# Patient Record
Sex: MALE | Race: BLACK OR AFRICAN AMERICAN
[De-identification: names, ages, dates, MRNs, and addresses within clinical notes are randomized per-mention and may not be internally consistent; named-entity substitution may affect disease eponyms.]

---

## 2017-02-20 ENCOUNTER — HOSPITAL ENCOUNTER (INPATIENT)
Dept: HOSPITAL 61 - ER | Age: 82
LOS: 7 days | Discharge: HOME | DRG: 166 | End: 2017-02-27
Attending: INTERNAL MEDICINE | Admitting: INTERNAL MEDICINE
Payer: COMMERCIAL

## 2017-02-20 VITALS — WEIGHT: 146.38 LBS | HEIGHT: 70 IN | BODY MASS INDEX: 20.96 KG/M2

## 2017-02-20 VITALS — DIASTOLIC BLOOD PRESSURE: 56 MMHG | SYSTOLIC BLOOD PRESSURE: 85 MMHG

## 2017-02-20 VITALS — SYSTOLIC BLOOD PRESSURE: 122 MMHG | DIASTOLIC BLOOD PRESSURE: 69 MMHG

## 2017-02-20 DIAGNOSIS — I25.10: ICD-10-CM

## 2017-02-20 DIAGNOSIS — Z80.1: ICD-10-CM

## 2017-02-20 DIAGNOSIS — N17.0: ICD-10-CM

## 2017-02-20 DIAGNOSIS — E86.0: ICD-10-CM

## 2017-02-20 DIAGNOSIS — N18.3: ICD-10-CM

## 2017-02-20 DIAGNOSIS — C34.02: Primary | ICD-10-CM

## 2017-02-20 DIAGNOSIS — E44.0: ICD-10-CM

## 2017-02-20 DIAGNOSIS — E11.00: ICD-10-CM

## 2017-02-20 DIAGNOSIS — E87.0: ICD-10-CM

## 2017-02-20 DIAGNOSIS — Z79.899: ICD-10-CM
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
## 2017-02-20 DIAGNOSIS — J95.811: ICD-10-CM

## 2017-02-20 DIAGNOSIS — Z87.891: ICD-10-CM

## 2017-02-20 DIAGNOSIS — E11.22: ICD-10-CM

## 2017-02-20 DIAGNOSIS — K76.89: ICD-10-CM

## 2017-02-20 DIAGNOSIS — I12.9: ICD-10-CM

## 2017-02-20 DIAGNOSIS — N28.1: ICD-10-CM

## 2017-02-20 DIAGNOSIS — N40.0: ICD-10-CM

## 2017-02-20 DIAGNOSIS — E11.65: ICD-10-CM

## 2017-02-20 DIAGNOSIS — R13.10: ICD-10-CM

## 2017-02-20 LAB
ANION GAP SERPL CALC-SCNC: 14 MMOL/L (ref 6–14)
ANION GAP SERPL CALC-SCNC: 15 MMOL/L (ref 6–14)
BASOPHILS # BLD AUTO: 0 X10^3/UL (ref 0–0.2)
BASOPHILS NFR BLD: 1 % (ref 0–3)
BUN SERPL-MCNC: 62 MG/DL (ref 8–26)
BUN SERPL-MCNC: 63 MG/DL (ref 8–26)
CALCIUM SERPL-MCNC: 10.2 MG/DL (ref 8.5–10.1)
CALCIUM SERPL-MCNC: 9.5 MG/DL (ref 8.5–10.1)
CHLORIDE SERPL-SCNC: 95 MMOL/L (ref 98–107)
CHLORIDE SERPL-SCNC: 98 MMOL/L (ref 98–107)
CO2 SERPL-SCNC: 20 MMOL/L (ref 21–32)
CO2 SERPL-SCNC: 24 MMOL/L (ref 21–32)
CREAT SERPL-MCNC: 2.5 MG/DL (ref 0.7–1.3)
CREAT SERPL-MCNC: 2.8 MG/DL (ref 0.7–1.3)
EOSINOPHIL NFR BLD: 2 % (ref 0–3)
ERYTHROCYTE [DISTWIDTH] IN BLOOD BY AUTOMATED COUNT: 14 % (ref 11.5–14.5)
GFR SERPLBLD BASED ON 1.73 SQ M-ARVRAT: 26.6 ML/MIN
GFR SERPLBLD BASED ON 1.73 SQ M-ARVRAT: 30.3 ML/MIN
GLUCOSE SERPL-MCNC: 628 MG/DL (ref 70–99)
GLUCOSE SERPL-MCNC: 775 MG/DL (ref 70–99)
HCT VFR BLD CALC: 44.5 % (ref 39–53)
HGB BLD-MCNC: 14.2 G/DL (ref 13–17.5)
LYMPHOCYTES # BLD: 1.3 X10^3/UL (ref 1–4.8)
LYMPHOCYTES NFR BLD AUTO: 27 % (ref 24–48)
MCH RBC QN AUTO: 27 PG (ref 25–35)
MCHC RBC AUTO-ENTMCNC: 32 G/DL (ref 31–37)
MCV RBC AUTO: 86 FL (ref 79–100)
MONOCYTES NFR BLD: 6 % (ref 0–9)
NEUTROPHILS NFR BLD AUTO: 65 % (ref 31–73)
OBC FLU: (no result)
PLATELET # BLD AUTO: 156 X10^3/UL (ref 140–400)
POTASSIUM SERPL-SCNC: 5.2 MMOL/L (ref 3.5–5.1)
POTASSIUM SERPL-SCNC: 5.3 MMOL/L (ref 3.5–5.1)
RBC # BLD AUTO: 5.17 X10^6/UL (ref 4.3–5.7)
SODIUM SERPL-SCNC: 133 MMOL/L (ref 136–145)
SODIUM SERPL-SCNC: 133 MMOL/L (ref 136–145)
WBC # BLD AUTO: 4.6 X10^3/UL (ref 4–11)

## 2017-02-20 PROCEDURE — 84100 ASSAY OF PHOSPHORUS: CPT

## 2017-02-20 PROCEDURE — 32557 INSERT CATH PLEURA W/ IMAGE: CPT

## 2017-02-20 PROCEDURE — 83036 HEMOGLOBIN GLYCOSYLATED A1C: CPT

## 2017-02-20 PROCEDURE — 81001 URINALYSIS AUTO W/SCOPE: CPT

## 2017-02-20 PROCEDURE — 87804 INFLUENZA ASSAY W/OPTIC: CPT

## 2017-02-20 PROCEDURE — 87086 URINE CULTURE/COLONY COUNT: CPT

## 2017-02-20 PROCEDURE — 71020: CPT

## 2017-02-20 PROCEDURE — 82570 ASSAY OF URINE CREATININE: CPT

## 2017-02-20 PROCEDURE — 71035: CPT

## 2017-02-20 PROCEDURE — 80053 COMPREHEN METABOLIC PANEL: CPT

## 2017-02-20 PROCEDURE — 71250 CT THORAX DX C-: CPT

## 2017-02-20 PROCEDURE — 94640 AIRWAY INHALATION TREATMENT: CPT

## 2017-02-20 PROCEDURE — 85027 COMPLETE CBC AUTOMATED: CPT

## 2017-02-20 PROCEDURE — 88305 TISSUE EXAM BY PATHOLOGIST: CPT

## 2017-02-20 PROCEDURE — 88104 CYTOPATH FL NONGYN SMEARS: CPT

## 2017-02-20 PROCEDURE — 90686 IIV4 VACC NO PRSV 0.5 ML IM: CPT

## 2017-02-20 PROCEDURE — 36415 COLL VENOUS BLD VENIPUNCTURE: CPT

## 2017-02-20 PROCEDURE — 96360 HYDRATION IV INFUSION INIT: CPT

## 2017-02-20 PROCEDURE — C1729 CATH, DRAINAGE: HCPCS

## 2017-02-20 PROCEDURE — 85018 HEMOGLOBIN: CPT

## 2017-02-20 PROCEDURE — 85610 PROTHROMBIN TIME: CPT

## 2017-02-20 PROCEDURE — 84156 ASSAY OF PROTEIN URINE: CPT

## 2017-02-20 PROCEDURE — 71010: CPT

## 2017-02-20 PROCEDURE — 77012 CT SCAN FOR NEEDLE BIOPSY: CPT

## 2017-02-20 PROCEDURE — 82310 ASSAY OF CALCIUM: CPT

## 2017-02-20 PROCEDURE — 87070 CULTURE OTHR SPECIMN AEROBIC: CPT

## 2017-02-20 PROCEDURE — C1892 INTRO/SHEATH,FIXED,PEEL-AWAY: HCPCS

## 2017-02-20 PROCEDURE — A4215 STERILE NEEDLE: HCPCS

## 2017-02-20 PROCEDURE — 84550 ASSAY OF BLOOD/URIC ACID: CPT

## 2017-02-20 PROCEDURE — 84300 ASSAY OF URINE SODIUM: CPT

## 2017-02-20 PROCEDURE — 88112 CYTOPATH CELL ENHANCE TECH: CPT

## 2017-02-20 PROCEDURE — 82550 ASSAY OF CK (CPK): CPT

## 2017-02-20 PROCEDURE — 70450 CT HEAD/BRAIN W/O DYE: CPT

## 2017-02-20 PROCEDURE — 87205 SMEAR GRAM STAIN: CPT

## 2017-02-20 PROCEDURE — 32405: CPT

## 2017-02-20 PROCEDURE — 74176 CT ABD & PELVIS W/O CONTRAST: CPT

## 2017-02-20 PROCEDURE — 82947 ASSAY GLUCOSE BLOOD QUANT: CPT

## 2017-02-20 PROCEDURE — 80069 RENAL FUNCTION PANEL: CPT

## 2017-02-20 PROCEDURE — 31622 DX BRONCHOSCOPE/WASH: CPT

## 2017-02-20 PROCEDURE — 93005 ELECTROCARDIOGRAM TRACING: CPT

## 2017-02-20 PROCEDURE — 83735 ASSAY OF MAGNESIUM: CPT

## 2017-02-20 PROCEDURE — 80048 BASIC METABOLIC PNL TOTAL CA: CPT

## 2017-02-20 PROCEDURE — 90732 PPSV23 VACC 2 YRS+ SUBQ/IM: CPT

## 2017-02-20 PROCEDURE — 76770 US EXAM ABDO BACK WALL COMP: CPT

## 2017-02-20 RX ADMIN — BACITRACIN SCH MLS/HR: 5000 INJECTION, POWDER, FOR SOLUTION INTRAMUSCULAR at 17:28

## 2017-02-20 RX ADMIN — ENOXAPARIN SODIUM SCH MG: 30 INJECTION SUBCUTANEOUS at 22:38

## 2017-02-20 NOTE — RAD
CT of the head without contrast, 2/20/2017:



History: Swallowing difficulty and blurred vision



Comparison is made to a study from 7/13/2009. There is mild cerebral atrophy.

The ventricles are within normal limits in size. There is no shift of the

midline structures. There is no evidence of acute intracranial hemorrhage or

mass effect.



IMPRESSION: No acute intracranial abnormality is detected.













PQRS Compliance Statement:



One or more of the following individualized dose reduction techniques were

utilized for this examination:

1. Automated exposure control

2. Adjustment of the mA and/or kV according to patient size

3. Use of iterative reconstruction technique

## 2017-02-20 NOTE — ACF
Admission Forms Criteria


                                                                           


                        GASTROENTEROLOGY [facility]





Clinical Indications for Admission to Inpatient Care


   


                                                                                

   (Place 'X' for any and all applicable criteria):


                     


Hospital admission is needed for appropriate care of the patient because of  

ANY ONE of the following: 


[ ]I.   Hemoperitoneum(7) 


[ ]II.  Ascites requiring acute treatment indicated by ANY ONE of the following(

8)(9):


        [ ]a)   Hemodynamic instability remaining after emergency or 

observation level care (as appropriate)


        [ ]b)   Peritoneal signs present (eg, abdominal rigidity, rebound 

tenderness, absent bowel sounds)


        [ ]c)   Tachypnea, Hypoxemia, or other respiratory symptoms remain 

after emergency or observation 


                 level care (as appropriate)


        [ ]d)   Suspected infected ascites as indicated by ANY ONE of the 

following:


                [ ]i)   Temperature greater than 100 degrees F (37.8 degrees C)


                [ ]ii)   Abdominal pain or tenderness not relieved by 

paracentesis


                [ ]iii)   Systemic signs of infection (eg, elevated WBC count, 

fever)


                [ ]iv)   Ascitic fluid analysis consistent with infection ( eg, 

elevated WBC count):


                [ ]v)   Vital sign abnormality


[ ]III.  Suspected acute intra-abdominal process indicated by ANY ONE of the 

following(1)(2)(3)(4)(5):


        [ ]a)  Hemodynamic instability 


        [ ]b)  Peritoneal signs present (eg, abdominal rigidity, rebound 

tenderness, absent bowel sounds) 


        [ ]c)  Bowel obstruction suspected (eg, severe vomiting, abdominal 

distension)


        [ ]d)  Suspected mesenteric ischemia or ischemic colitis(6)


        [ ]e)  Other signs or symptoms of acute abdominal disease (eg, severe 

pain, free air):


[ ]IV. Severe liver disease indicated by ANY ONE of the following(8)(9)(10)(11)(

12)(13)(14):


        [ ]a)  Acute hepatitis (eg, transaminase level greater than 1000 IU/L)


        [ ]b)  Acute elevation of prothrombin time to more than 50% above 

normal or INR greater than 1.5


        [ ]c)  Bilirubin greater than 20 mg/dL (342 micromoles/L) (15)


        [ ]d)  New-onset or worsening hepatic encephalopathy 


        [ ]e)  Acute liver necrosis


        [ ]f)   Vomiting or dehydration that is severe of persistent


        [ ]g)  Hemodynamic instability due to liver disease


        [ ]h)  Acute renal failure


        [ ]i)   Hepatic abscess


        [ ]j)   Dehydration that is severe or persistent


        [ ]k)  Hepatic hydrothorax(21)


        [ ]l)   Other indications of severe liver disease (eg, persistent fever

, ingestion of hepatotoxin)


[ ]V. Severe diarrhea indicated by ANY ONE of the following(17)(18)(19)(20)(21)(

22)(23):


        [ ]a)  High fever or other high-risk infection situation


        [ ]b)  Intractable bloody diarrhea (eg, more than 6 bloody stools per 

day)


        [ ]c)  Suspected Clostridium difficile-associated diarrhea(24)


        [ ]d)  Change in mental status that persists after emergency or 

observation level care (as appropriate)


        [ ]e)  Severe dehydration (eg, greater than 9% loss of body weight in 

children)


        [ ]f)   Inability to maintain hydration


        [ ]g)  Peritoneal signs present (eg, abdominal rigidity, rebound 

tenderness, absent bowel sounds)


        [ ]h)  Abdominal ischemia suspected(6)


        [ ]i)   Hemodynamic instability that persists after emergency or 

observation level care (as appropriate)


        [ ]j)   Severe electrolyte abnormalities requiring inpatient care


        [ ]k)  Acute renal failure


[ ]VI. Suspected toxic megacolon(5)(6)


[X]VII.Severe dysphagia indicated by ANY ONE of the following(25)(26):


        [ ]a)  Suspected esophageal perforation or fistula(27)


        [ ]b)  Suspected cause that requires inpatient care (eg, caustic 

ingestion, severe esophagitis) (28)


        [ ]c)  Severe dehydration (eg, greater than 9% loss of body weight in 

children)


        [ ]d)  Inability to manage secretions or maintain hydration


        [ ]e)  Hemodynamic instability that persists after emergency or 

observation level care (as appropriate)


        [ ]f)   Severe electrolyte abnormalities requiring inpatient care


        [X]g)  Acute renal failure


[ ]VIII.Vomiting and ANY ONE of the following (29)(30)(31)(32):


        [ ]a)  High fever or other high-risk infection situation


        [ ]b)  Change in mental status that persists after emergency or 

observation level care (as appropriate)


        [ ]c)  Severe dehydration (e.g., greater than 9% loss of body weight in 

children)


        [ ]d)  Peritoneal signs present (e.g., abdominal rigidity, rebound 

tenderness, absent bowel sounds)


        [ ]e)  Hemodynamic instability that persists after emergency or 

observation level care (as appropriate)


        [ ]f)   Severe electrolyte abnormalities requiring inpatient care


        [ ]g)  Acute renal failure


        [ ]h)  Bowel obstruction suspected (e.g., severe vomiting, abdominal 

distension)


        [ ]i)   Vomiting that is severe or persistent after medical treatment


[ ]IX. Significant dehydration indicated by ANY ONE of the following(23)(24)(25)


        [ ]a)  Clinical findings of severe dehydration indicated by ANY ONE of 

the following:


                [ ]i)    Acute loss of weight from baseline (5% of body weight 

in adults, 9% in pediatric patients)


                [ ]ii)   Hemodynamic instability


                [ ]iii)  Acute renal failure


                [ ]iv)  Serum sodium greater than 150 mEq/L (mmol/L)  


       [ ]b)   Dehydration that is persistent indicated by ALL of the following:


                [ ]i)   Oral rehydration therapy not tolerated or insufficient 

to adequately correct dehydration


                [ ]ii)  Appropriate intravenous treatment (eg, fluids) does not 

readily correct dehydration hours of  (ie, after 12 to 24 of treatment)  


[ ]X.  Gastroparesis and ANY ONE of the following(37)(38)(39): 


        [ ]a)  Dehydration that is severe or persistent


        [ ]b)  Severe electrolyte abnormalities requiring inpatient care 


        [ ]c)  Acute renal failure


        [ ]d)  Vomiting that is severe or persistent


[ ]XI. Complications of transplanted liver indicated by ANY ONE of the following

(40)(41):


        [ ]a)   Acute graft rejection requiring inpatient management (eg, 

intravenous immunosuppression)(42)


        [ ]b)   Failure of transplanted liver as indicated by ANY ONE of the 

following:


                 [ ]i)    Acute hepatitis (eg, transaminase level greater than 

1000 International Units per liter (IU/L))


                 [ ]ii)   Acute elevation of prothrombin time to more than 50% 

above baseline or INR greater than 1.5


                 [ ]iii)  Bilirubin greater than 20 mg/dL (342 micromoles/L)


                 [ ]iv)  New-onset or worsening hepatic encephalopathy


                 [ ]v)   Acute elevation of serum ammonia level (eg, greater 

than 210 mcg/dL (150 micromoles/L))


                 [ ]vi)  Acute liver necrosis


        [ ]c)   Infection requiring inpatient management (eg, Hemodynamic 

instability, need for intravenous antimicrobial               


                 treatment)(43)(44)(45)(46)(47)(48)(49)(50)


        [ ]d)   Other complication of transplanted liver (eg, thrombosis, 

autoimmune hepatitis, variceal bleeding) requiring inpatient management(51)(52) 


[ ]XII  Complications of transplanted pancreas indicated by ANY ONE of the 

following(53):


        [ ]a)  Acute graft rejection requiring inpatient management (eg, 

intravenous immunosuppression)(42)(54)


        [ ]b)  Failure of transplanted pancreas as indicated by ANY ONE of the 

following: 


                [ ]i)   Serum amylase greater than 3 times the upper limit of 

normal or baseline


                [ ]ii)   Serum lipase greater than 3 times the upper limit of 

normal or baseline 


                [ ]iii)  Imaging findings consistent with pancreatic 

inflammation or necrosis  


        [ ]c)  Infection requiring inpatient management (eg, Hemodynamic 

instability, need for intravenous antimicrobial               


                treatment)(43)(44)(45)(46)(47)(48)(49)(50)


        [ ]d)  Other complication of transplanted liver (eg, thrombosis, 

autoimmune hepatitis, variceal bleeding) requiring inpatient management(51)(52)

   


[ ]X.  Gastroenterology condition and ALL of the following:


        [ ]a)  Symptom or finding for which emergency and observation care have 

failed or are not considered 


                appropriate (Also use General Criteria: Observation Care as   

appropriate)


        [ ]b)  Presence of ANY ONE of the following:


               [ ]i)    A General Admission Criteria


               [ ]ii)   A Pediatric General Admission Criteria.


        





The original [facility] [employer] content created by [facility] [employer] has been revised. 


The portions of the  content which have been revised are identified through the 

use of italic text or in bold,and [facility] 


has neither reviewed  nor approved the modified material. All other unmodified 

content is copyright  [facility].





Please see references footnoted in the original [facility] edition 

2016





Admission Criteria Met?:  Yes








DARINEL CARRILLO Feb 20, 2017 23:57

## 2017-02-20 NOTE — PDOC1
History and Physical


Date of Admission


Date of Admission


DATE: 2/20/17 


TIME: 20:43





Identification/Chief Complaint


Chief Complaint


dysphagia





Source


Source:  Chart review, Patient





History of Present Illness


History of Present Illness


Mr. Garrett,  is a 81  year old male  admit for acute difficulty swallowing.  

He has been unable to eat solid food for a few days.  Has been drinking what he 

feels in gallons of water, and then urinating all day.


He reports drinking more than a gallon of milk the other day to try to get 

calories and stay hydrated.


He felt weak and unwell, was concerned about having flu.


He reports  food "gets stuck" and come back up.  States he has lost 21 pounds 

in a week. 


He feels much better since IV Fluid given in the ER<    blood sugar > 600





Past Medical History


Cardiovascular:  No pertinent hx


Pulmonary:  No pertinent hx


GI:  No pertinent hx


Heme/Onc:  No pertinent hx


Hepatobiliary:  No pertinent hx


Endocrine:  No pertinent hx


Dermatology:  No pertinent hx





Family History


Family History


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





Social History


Smoke:  No


ALCOHOL:  none


Drugs:  None





Current Problem List


Problem List


 Problems


Medical Problems:


(1) ARF (acute renal failure)


Status: Acute  





(2) Dysphagia


Status: Acute  





(3) Dysphagia


Status: Acute  





(4) Hyperglycemia


Status: Acute  








Problems:  





Current Medications


Current Medications





 Current Medications


Sodium Chloride 500 ml @  500 mls/hr 1X  ONCE IV  Last administered on 2/20/ 17at 15:30;  Start 2/20/17 at 15:30;  Stop 2/20/17 at 16:29;  Status DC


Sodium Chloride (Iv Sodium Chloride 0.9% 1000ml Bag) 1,000 ml @  510 mls/hr 

Q1H58M IV ;  Start 2/20/17 at 17:15;  Stop 2/20/17 at 21:15


Ondansetron HCl 4 mg 4 mg PRN Q8HRS  PRN IV NAUSEA/VOMITING;  Start 2/20/17 at 

17:15;  Stop 2/21/17 at 17:14


Sodium Chloride (Iv Sodium Chloride 0.9% 1000ml Bag) 1,000 ml @  125 mls/hr Q8H 

IV  Last administered on 2/20/17at 17:28;  Start 2/20/17 at 17:04;  Stop 2/21/ 17 at 17:03


Acetaminophen (Tylenol) 650 mg PRN Q4HRS  PRN PO FEVER;  Start 2/20/17 at 17:15

;  Stop 2/21/17 at 17:14





Allergies


Allergies:  


Coded Allergies:  


     No Known Drug Allergies (Unverified , 6/29/16)





ROS


General:  No: Appetite, Chills, Fatigue, Malaise, Night Sweats, Other


PSYCHOLOGICAL ROS:  No: Anxiety, Behavioral Disorder, Concentration difficultie

, Decreased libido, Depression, Disorientation, Hallucinations, Hostility, 

Irritablity, Memory difficulties, Mood Swings, Obsessive thoughts, Other, 

Physical abuse, Sexual abuse, Sleep disturbances, Suicidal ideation


Eyes:  No Blurry vision, No Decreased vision, No Double vision, No Dry eyes, No 

Excessive tearing, No Eye Pain, No Itchy Eyes, No Loss of vision, No Other, No 

Photophobia, No Scotomata, No Uses contacts, No Uses glasses


HEENT:  No: Epistaxis, Heacaches, Hearing change, Nasal congestion, Nasal 

discharge, Oral lesions, Other, Sinus pain, Sneezing, Snoring, Sore Throat, 

Tinnitus, Vertigo, Visual Changes, Vocal changes


Respiratory:  No: Cough, Hemoptysis, Orthopnea, Other, Pleuritic Pain, SOB with 

excertion, Shortness of breath, Sputum Changes, Stridor, Tachypnea, Wheezing


Gastrointestinal:  Yes Nausea, Yes Other, 


   No Abdominal Pain, No Constipation, No Diarrhea, No Hematochezia, No Melena, 

No Vomiting


Genitourinary:  No , No , No , No , No , No , No , No Discharge, No Dysuria, No 

Flank Pain, No Frequency, No Hematuria, No Incontinence, No Other, No Pain, No 

Retention, No Urgency


Musculoskeletal:  Yes Joint Pain, 


   No Gait Disturbance, No Joint Stiffness, No Joint Swelling, No Muscle Pain, 

No Muscular Weakness, No Other, No Pain In:, No Swelling In:


Neurological:  No Behavorial Changes, No Bowel/Bladder ControlChng, No Confusion

, No Dizziness, No Gait Disturbance, No Headaches, No Impaired Coord/balance, 

No Memory Loss, No Numbness/Tingling, No Other, No Seizures, No Speech Problems

, No Tremors, No Visual Changes, No Weakness


Skin:  No Acne, No Dry Skin, No Eczema, No Hair Changes, No Lumps, No Mole 

Changes, No Mottling, No Nail Changes, No Other, No Pruritus, No Rash, No Skin 

Lesion Changes





Physical Exam


General:  Alert, Oriented X3, Cooperative, No acute distress


HEENT:  Atraumatic, PERRLA


Lungs:  Clear to auscultation


Heart:  RRR, no murmurs


Abdomen:  Normal bowel sounds, Soft, No tenderness, No hepatosplenomegaly


Rectal Exam:  not examined


Extremities:  No clubbing, No cyanosis


Skin:  No rashes, Other (dry skin,  poor turgor,   )


Neuro:  Normal speech, Strength at 5/5 X4 ext, Sensation intact, Cranial nerves 

3-12 NL


Psych/Mental Status:  Mental status NL, Mood NL





Vitals


Vitals





 Vital Signs








  Date Time  Temp Pulse Resp B/P Pulse Ox O2 Delivery O2 Flow Rate FiO2


 


2/20/17 18:30  96  111/72 95 Room Air  


 


2/20/17 14:06 98.0  16     





 98.0       











Labs


Labs





Laboratory Tests








Test


  2/20/17


14:00 2/20/17


15:12


 


Influenza Type A Antigen


  Negative


(NEGATIVE) 


 


 


Influenza Type B Antigen


  Negative


(NEGATIVE) 


 


 


White Blood Count


  


  4.6x10^3/uL


(4.0-11.0)


 


Red Blood Count


  


  5.17x10^6/uL


(4.30-5.70)


 


Hemoglobin


  


  14.2g/dL


(13.0-17.5)


 


Hematocrit


  


  44.5%


(39.0-53.0)


 


Mean Corpuscular Volume  86fL () 


 


Mean Corpuscular Hemoglobin  27pg (25-35) 


 


Mean Corpuscular Hemoglobin


Concent 


  32g/dL (31-37) 


 


 


Red Cell Distribution Width


  


  14.0%


(11.5-14.5)


 


Platelet Count


  


  156x10^3/uL


(140-400)


 


Neutrophils (%) (Auto)  65% (31-73) 


 


Lymphocytes (%) (Auto)  27% (24-48) 


 


Monocytes (%) (Auto)  6% (0-9) 


 


Eosinophils (%) (Auto)  2% (0-3) 


 


Basophils (%) (Auto)  1% (0-3) 


 


Neutrophils # (Auto)


  


  3.0x10^3uL


(1.8-7.7)


 


Lymphocytes # (Auto)


  


  1.3x10^3/uL


(1.0-4.8)


 


Monocytes # (Auto)


  


  0.3x10^3/uL


(0.0-1.1)


 


Eosinophils # (Auto)


  


  0.1x10^3/uL


(0.0-0.7)


 


Basophils # (Auto)


  


  0.0x10^3/uL


(0.0-0.2)


 


Sodium Level


  


  133mmol/L


(136-145)


 


Potassium Level


  


  5.2mmol/L


(3.5-5.1)


 


Chloride Level


  


  95mmol/L


()


 


Carbon Dioxide Level


  


  24mmol/L


(21-32)


 


Anion Gap  14 (6-14) 


 


Blood Urea Nitrogen  62mg/dL (8-26) 


 


Creatinine


  


  2.8mg/dL


(0.7-1.3)


 


Estimated GFR


(Cockcroft-Gault) 


  26.6 


 


 


Glucose Level


  


  628mg/dL


(70-99)


 


Calcium Level


  


  10.2mg/dL


(8.5-10.1)








Laboratory Tests








Test


  2/20/17


14:00 2/20/17


15:12


 


Influenza Type A Antigen


  Negative


(NEGATIVE) 


 


 


Influenza Type B Antigen


  Negative


(NEGATIVE) 


 


 


White Blood Count


  


  4.6x10^3/uL


(4.0-11.0)


 


Red Blood Count


  


  5.17x10^6/uL


(4.30-5.70)


 


Hemoglobin


  


  14.2g/dL


(13.0-17.5)


 


Hematocrit


  


  44.5%


(39.0-53.0)


 


Mean Corpuscular Volume  86fL () 


 


Mean Corpuscular Hemoglobin  27pg (25-35) 


 


Mean Corpuscular Hemoglobin


Concent 


  32g/dL (31-37) 


 


 


Red Cell Distribution Width


  


  14.0%


(11.5-14.5)


 


Platelet Count


  


  156x10^3/uL


(140-400)


 


Neutrophils (%) (Auto)  65% (31-73) 


 


Lymphocytes (%) (Auto)  27% (24-48) 


 


Monocytes (%) (Auto)  6% (0-9) 


 


Eosinophils (%) (Auto)  2% (0-3) 


 


Basophils (%) (Auto)  1% (0-3) 


 


Neutrophils # (Auto)


  


  3.0x10^3uL


(1.8-7.7)


 


Lymphocytes # (Auto)


  


  1.3x10^3/uL


(1.0-4.8)


 


Monocytes # (Auto)


  


  0.3x10^3/uL


(0.0-1.1)


 


Eosinophils # (Auto)


  


  0.1x10^3/uL


(0.0-0.7)


 


Basophils # (Auto)


  


  0.0x10^3/uL


(0.0-0.2)


 


Sodium Level


  


  133mmol/L


(136-145)


 


Potassium Level


  


  5.2mmol/L


(3.5-5.1)


 


Chloride Level


  


  95mmol/L


()


 


Carbon Dioxide Level


  


  24mmol/L


(21-32)


 


Anion Gap  14 (6-14) 


 


Blood Urea Nitrogen  62mg/dL (8-26) 


 


Creatinine


  


  2.8mg/dL


(0.7-1.3)


 


Estimated GFR


(Cockcroft-Gault) 


  26.6 


 


 


Glucose Level


  


  628mg/dL


(70-99)


 


Calcium Level


  


  10.2mg/dL


(8.5-10.1)











VTE Prophylaxis Ordered


VTE Prophylaxis Devices:  No


VTE Pharmacological Prophylaxi:  Yes





Assessment/Plan


Assessment/Plan


acute renal failure, vasomotor nephropathy


Hyperosmolar, not ketotic


DM2,    alarming hyperglycemia,   give IV reg X1,  recheck labs,   K+ may drop 

markedly





dysphagia,  globus sensation "food getting stuck"  he feels improved already 

and would like to try to eat more





check more labs in AM, suspect mod malnutrition, weight loss,   BMI 21,    20 

lbs weight loss








LANIE OAKLEY MD Feb 20, 2017 20:43

## 2017-02-20 NOTE — RAD
Chest, 2 views, 2/20/2017:



History: Difficulty swallowing



Comparison is made to a study from can't 18 2010. The heart size is normal.

There appears to be a new opacity in the left lower lobe along the

posteroinferior aspect of the left hilum. There is attenuation of the upper

lobe pulmonary vessels suggesting underlying emphysema. No other pulmonary

infiltrate or mass is seen. An old rib fractures present on the lower left.





IMPRESSION: New left lower lobe opacity suggesting a neoplasm versus focal

pneumonitis. CT scanning is suggested for further evaluation.

## 2017-02-20 NOTE — PHYS DOC
Past Medical History


Past Medical History:  Hypertension


Past Surgical History:  Other


Additional Past Surgical Histo:  thyroid, hernia


Alcohol Use:  None


Drug Use:  None





Adult General


Chief Complaint


Chief Complaint:  FLU SYMPTOM





HPI


HPI


Patient is a 81  year old male who presents with family for evaluation of 1 

week of difficulty swallowing followed by general weakness and weight loss. He 

tries to swallow solids, then the food "gets stuck" and come back up. States he 

has lost 21 pounds in a week. He has been drinking liquids because he does not 

have a problem with that. He has slight rhinorrhea. He thinks he may have 

influenza. He denies chest pain, dyspnea, headache, dizziness, nausea, fever or 

chills, myalgia, cough, diarrhea, dysuria.





Review of Systems


Review of Systems





Constitutional: Denies fever or chills []


Eyes: Denies change in visual acuity, redness, or eye pain []


HENT: Denies nasal congestion or sore throat []


Respiratory: Denies cough or shortness of breath []


Cardiovascular: No additional information not addressed in HPI []


GI: Denies abdominal pain, nausea, bloody stools or diarrhea []


: Denies dysuria or hematuria []


Musculoskeletal: Denies back pain or joint pain []


Integument: Denies rash or skin lesions []


Neurologic: Denies headache, focal weakness or sensory changes []


Endocrine: Denies polyuria or polydipsia []





Current Medications


Current Medications





 Current Medications








 Medications


  (Trade)  Dose


 Ordered  Sig/Krystal  Start Time


 Stop Time Status Last Admin


Dose Admin


 


 Sodium Chloride


  (Iv Sodium


 Chloride 0.9%


 500ml Bag)  500 ml @ 


 500 mls/hr  1X  ONCE  2/20/17 15:30


 2/20/17 16:29  2/20/17 15:30


500 MLS/HR











Allergies


Allergies





 Allergies








Coded Allergies Type Severity Reaction Last Updated Verified


 


  No Known Drug Allergies    6/29/16 No











Physical Exam


Physical Exam





Constitutional: Well developed, well nourished, no acute distress, non-toxic 

appearance. []


HENT: Normocephalic, atraumatic, bilateral external ears normal, oropharynx 

moist, no oral exudates, nose normal. []


Eyes: PERRLA, EOMI. [] 


Neck: Normal range of motion, supple. [] 


Cardiovascular:Heart rate regular rhythm []


Lungs & Thorax:  Bilateral breath sounds clear to auscultation []


Abdomen: Bowel sounds normal, soft, no tenderness, no pulsatile masses. [] 


Skin: Warm, dry, no erythema, no rash. [] 


Back: No tenderness, no CVA tenderness. [] 


Extremities: ROM intact, no edema. [] 


Neurologic: Alert and oriented X 3, normal motor function, normal sensory 

function, no focal deficits noted, cranial nerves II through XII intact. []


Psychologic: Affect normal, judgement normal, mood normal. []





Current Patient Data


Vital Signs





 Vital Signs








  Date Time  Temp Pulse Resp B/P Pulse Ox O2 Delivery O2 Flow Rate FiO2


 


2/20/17 14:06 98.0 82 16 88/66 97 Room Air  





 98.0       








Lab Values





 Laboratory Tests








Test


  2/20/17


14:00 2/20/17


15:12


 


Influenza Type A Antigen


  Negative


(NEGATIVE) 


 


 


Influenza Type B Antigen


  Negative


(NEGATIVE) 


 


 


White Blood Count


  


  4.6x10^3/uL


(4.0-11.0)


 


Red Blood Count


  


  5.17x10^6/uL


(4.30-5.70)


 


Hemoglobin


  


  14.2g/dL


(13.0-17.5)


 


Hematocrit


  


  44.5%


(39.0-53.0)


 


Mean Corpuscular Volume  86fL ()  


 


Mean Corpuscular Hemoglobin  27pg (25-35)  


 


Mean Corpuscular Hemoglobin


Concent 


  32g/dL (31-37)


 


 


Red Cell Distribution Width


  


  14.0%


(11.5-14.5)


 


Platelet Count


  


  156x10^3/uL


(140-400)


 


Neutrophils (%) (Auto)  65% (31-73)  


 


Lymphocytes (%) (Auto)  27% (24-48)  


 


Monocytes (%) (Auto)  6% (0-9)  


 


Eosinophils (%) (Auto)  2% (0-3)  


 


Basophils (%) (Auto)  1% (0-3)  


 


Neutrophils # (Auto)


  


  3.0x10^3uL


(1.8-7.7)


 


Lymphocytes # (Auto)


  


  1.3x10^3/uL


(1.0-4.8)


 


Monocytes # (Auto)


  


  0.3x10^3/uL


(0.0-1.1)


 


Eosinophils # (Auto)


  


  0.1x10^3/uL


(0.0-0.7)


 


Basophils # (Auto)


  


  0.0x10^3/uL


(0.0-0.2)





 Laboratory Tests


2/20/17 15:12














EKG


EKG


EKG as interpreted by me as sinus tachycardia, rate 100s, no ST elevation or 

depression, slightly peaked t waves, no ectopy





Radiology/Procedures


Radiology/Procedures


Head CT without contrast


IMPRESSION: No acute intracranial abnormality is detected.





DICTATED and SIGNED BY:     MORITZ,RICK S MD


DATE:     02/20/17 1447








Chest x-ray


IMPRESSION: New left lower lobe opacity suggesting a neoplasm versus focal


pneumonitis. CT scanning is suggested for further evaluation.





DICTATED and SIGNED BY:     MORITZ,RICK S MD


DATE:     02/20/17 1450





Course & Med Decision Making


Course & Med Decision Making


Pertinent Labs and Imaging studies reviewed. (See chart for details)





CT head and XR chest as above; XR chest concerning for lung nodule. CBC and flu 

swab normal. 


Transition of care to Dr. Peña pending BMP. Dispo accordingly.  -MD Cathy Harris Disclaimer


Dragon Disclaimer


This electronic medical record was generated, in whole or in part, using a 

voice recognition dictation system.





Departure


Departure


Impression:  


 Primary Impression:  


 Dysphagia


Referrals:  


LIZZIE GARAY MD (PCP)





Problem Qualifiers








 Primary Impression:  


 Dysphagia


 Dysphagia type:  unspecified  Qualified Code:  R13.10 - Dysphagia, unspecified





SURESH CARRILLO MD Feb 20, 2017 15:34

## 2017-02-21 VITALS — DIASTOLIC BLOOD PRESSURE: 83 MMHG | SYSTOLIC BLOOD PRESSURE: 128 MMHG

## 2017-02-21 VITALS — SYSTOLIC BLOOD PRESSURE: 112 MMHG

## 2017-02-21 VITALS — DIASTOLIC BLOOD PRESSURE: 50 MMHG | SYSTOLIC BLOOD PRESSURE: 85 MMHG

## 2017-02-21 VITALS — DIASTOLIC BLOOD PRESSURE: 79 MMHG | SYSTOLIC BLOOD PRESSURE: 123 MMHG

## 2017-02-21 VITALS — SYSTOLIC BLOOD PRESSURE: 133 MMHG | DIASTOLIC BLOOD PRESSURE: 77 MMHG

## 2017-02-21 VITALS — SYSTOLIC BLOOD PRESSURE: 126 MMHG | DIASTOLIC BLOOD PRESSURE: 85 MMHG

## 2017-02-21 LAB
ALBUMIN SERPL-MCNC: 3.1 G/DL (ref 3.4–5)
ALBUMIN/GLOB SERPL: 0.8 {RATIO} (ref 1–1.7)
ALP SERPL-CCNC: 129 U/L (ref 46–116)
ALT SERPL-CCNC: 63 U/L (ref 16–63)
ANION GAP SERPL CALC-SCNC: 12 MMOL/L (ref 6–14)
ANION GAP SERPL CALC-SCNC: 13 MMOL/L (ref 6–14)
AST SERPL-CCNC: 37 U/L (ref 15–37)
BACTERIA #/AREA URNS HPF: (no result) /HPF
BASOPHILS # BLD AUTO: 0 X10^3/UL (ref 0–0.2)
BASOPHILS NFR BLD: 0 % (ref 0–3)
BILIRUB SERPL-MCNC: 0.3 MG/DL (ref 0.2–1)
BILIRUB UR QL STRIP: NEGATIVE
BUN SERPL-MCNC: 54 MG/DL (ref 8–26)
BUN SERPL-MCNC: 61 MG/DL (ref 8–26)
BUN/CREAT SERPL: 26 (ref 6–20)
CALCIUM SERPL-MCNC: 9.6 MG/DL (ref 8.5–10.1)
CALCIUM SERPL-MCNC: 9.9 MG/DL (ref 8.5–10.1)
CHLORIDE SERPL-SCNC: 100 MMOL/L (ref 98–107)
CHLORIDE SERPL-SCNC: 107 MMOL/L (ref 98–107)
CK SERPL-CCNC: 153 U/L (ref 39–308)
CO2 SERPL-SCNC: 22 MMOL/L (ref 21–32)
CO2 SERPL-SCNC: 25 MMOL/L (ref 21–32)
CREAT SERPL-MCNC: 2.1 MG/DL (ref 0.7–1.3)
CREAT SERPL-MCNC: 2.5 MG/DL (ref 0.7–1.3)
EOSINOPHIL NFR BLD: 3 % (ref 0–3)
ERYTHROCYTE [DISTWIDTH] IN BLOOD BY AUTOMATED COUNT: 13.9 % (ref 11.5–14.5)
GFR SERPLBLD BASED ON 1.73 SQ M-ARVRAT: 30.3 ML/MIN
GFR SERPLBLD BASED ON 1.73 SQ M-ARVRAT: 37 ML/MIN
GLOBULIN SER-MCNC: 3.9 G/DL (ref 2.2–3.8)
GLUCOSE SERPL-MCNC: 666 MG/DL (ref 70–99)
GLUCOSE SERPL-MCNC: 98 MG/DL (ref 70–99)
GLUCOSE UR STRIP-MCNC: >=1000 MG/DL
HCT VFR BLD CALC: 40.6 % (ref 39–53)
HGB BLD-MCNC: 13.2 G/DL (ref 13–17.5)
LYMPHOCYTES # BLD: 1.8 X10^3/UL (ref 1–4.8)
LYMPHOCYTES NFR BLD AUTO: 37 % (ref 24–48)
MAGNESIUM SERPL-MCNC: 2.1 MG/DL (ref 1.8–2.4)
MCH RBC QN AUTO: 27 PG (ref 25–35)
MCHC RBC AUTO-ENTMCNC: 33 G/DL (ref 31–37)
MCV RBC AUTO: 84 FL (ref 79–100)
MONOCYTES NFR BLD: 8 % (ref 0–9)
NEUTROPHILS NFR BLD AUTO: 52 % (ref 31–73)
NITRITE UR QL STRIP: NEGATIVE
PH UR STRIP: 5.5 [PH]
PHOSPHATE SERPL-MCNC: 2.7 MG/DL (ref 2.6–4.7)
PLATELET # BLD AUTO: 145 X10^3/UL (ref 140–400)
POTASSIUM SERPL-SCNC: 3.7 MMOL/L (ref 3.5–5.1)
POTASSIUM SERPL-SCNC: 5 MMOL/L (ref 3.5–5.1)
PROT SERPL-MCNC: 7 G/DL (ref 6.4–8.2)
PROT UR STRIP-MCNC: NEGATIVE MG/DL
PROT UR-MCNC: 15.7 MG/DL
PROT/CREAT UR-RTO: 239 MG/G CREAT (ref 0–200)
RBC # BLD AUTO: 4.83 X10^6/UL (ref 4.3–5.7)
RBC #/AREA URNS HPF: (no result) /HPF (ref 0–2)
SODIUM SERPL-SCNC: 135 MMOL/L (ref 136–145)
SODIUM SERPL-SCNC: 144 MMOL/L (ref 136–145)
SP GR UR STRIP: 1.02
SQUAMOUS #/AREA URNS LPF: (no result) /LPF
URATE SERPL-MCNC: 10.2 MG/DL (ref 3.5–7.2)
UROBILINOGEN UR-MCNC: 0.2 MG/DL
WBC # BLD AUTO: 4.9 X10^3/UL (ref 4–11)

## 2017-02-21 RX ADMIN — GLYBURIDE SCH MG: 5 TABLET ORAL at 17:54

## 2017-02-21 RX ADMIN — INSULIN ASPART SCH UNITS: 100 INJECTION, SOLUTION INTRAVENOUS; SUBCUTANEOUS at 12:33

## 2017-02-21 RX ADMIN — BACITRACIN SCH MLS/HR: 5000 INJECTION, POWDER, FOR SOLUTION INTRAMUSCULAR at 02:30

## 2017-02-21 RX ADMIN — ENOXAPARIN SODIUM SCH MG: 30 INJECTION SUBCUTANEOUS at 21:58

## 2017-02-21 RX ADMIN — BACITRACIN SCH MLS/HR: 5000 INJECTION, POWDER, FOR SOLUTION INTRAMUSCULAR at 12:26

## 2017-02-21 RX ADMIN — INSULIN ASPART SCH UNITS: 100 INJECTION, SOLUTION INTRAVENOUS; SUBCUTANEOUS at 08:00

## 2017-02-21 RX ADMIN — POTASSIUM CHLORIDE SCH MEQ: 1500 TABLET, EXTENDED RELEASE ORAL at 10:03

## 2017-02-21 RX ADMIN — INSULIN ASPART SCH UNITS: 100 INJECTION, SOLUTION INTRAVENOUS; SUBCUTANEOUS at 17:58

## 2017-02-21 NOTE — RAD
PQRS Compliance Statement:



One or more of the following individualized dose reduction techniques were

utilized for this examination:

1. Automated exposure control

2. Adjustment of the mA and/or kV according to patient size

3. Use of iterative reconstruction technique





CT of the chest without contrast, 2/21/2017:



History: Left lower lobe opacity



No IV contrast was administered for this exam due to the patient's known renal

insufficiency.



The thoracic aorta is of normal caliber. Several coronary artery

calcifications are noted. The heart is not enlarged. No mediastinal adenopathy

is seen. The thyroid gland is incompletely delineated on these scans but

appears to be enlarged, more so on the right. 



There is a mass in the left lower lobe centered along the posterior aspect of

the left hilum. Its margins are irregular and spiculated. It is therefore

difficult to accurately measure, but is estimated at 4.4 cm in greatest

diameter. There is mild adjacent streaky infiltrate in the superior segment of

the left lower lobe. The mass cannot be  from the vascular structures

at the hilum on these noncontrast scans. The left lower lobe bronchus is

patent. This mass probably involves the left lower lobe superior segmental

bronchus. No air bronchograms are seen within this lesion.



There are scattered linear parenchymal opacities in other portions of both

lungs compatible with scars. There are groundglass and interstitial opacities

in the posterior aspects of both lungs compatible with mild dependent

atelectasis and/or edema. No pleural fluid is evident.



The adrenal glands are unremarkable. Several low density lesions are present

in both lobes of the liver. The largest of these lies posteriorly in the left

lobe and measures 3.6 cm. It demonstrates a low internal CT number compatible

with a cyst. The other smaller lesions demonstrate similar CT characteristics

and are probably cysts, although some are too small to definitively

characterize.





IMPRESSION:

1. Left lower lobe mass involving the left hilum most likely represents a

primary lung malignancy. An inflammatory process is much less likely.

Bronchoscopic evaluation is suggested.

2. No mediastinal adenopathy is evident.

3. Mild coronary artery calcifications.

4. Hepatic cysts

## 2017-02-21 NOTE — EKG
[facility]

               [address] [place] [address]

Test Date:    2017               Test Time:    13:57:40

Pat Name:     JM MARIN           Department:   

Patient ID:   [id number]           Room:         524 1

Gender:       M                        Technician:   

:          [phone]               Requested By: SURESH CARRILLO

Order Number: [id number]            Reading MD:   Kendal Landry

                                 Measurements

Intervals                              Axis          

Rate:         209                      P:            

AL:                                    QRS:          56

QRSD:         130                      T:            -165

QT:           268                                    

QTc:          506                                    

                           Interpretive Statements

SINUS RHYTHM, 

LOW VOLTAGE

OTHERWISE NORMAL EKG

RI6.01

No previous ECG available for comparison



Electronically Signed On 2017 19:27:40 CST by Kendal Landry

## 2017-02-21 NOTE — PDOC
PROGRESS NOTES


Chief Complaint


Chief Complaint


HONK


DM new dx


acute renal failure, vasomotor nephropathy


Dysphagia, globus sensation, POA, resolved





History of Present Illness


History of Present Illness


BS 70s in AM, got levemir 25 last night


Hgba1c pending


CXR shows new lung nodule, pt WAS a heavy smoker, quit 25 yrs ago





NO more dysphagia, ready for solids





PLAn:


Advance to DM diet


DM education dc


levemir and novolog scheduled - BS on low side now and he is INSULIN naive


HE can start with some sulfonylureas, no metformin given MAYANK


CT scan to further delineate new lung nodule


Dw pt and RN


Keep SSI





Vitals


Vitals





 Vital Signs








  Date Time  Temp Pulse Resp B/P Pulse Ox O2 Delivery O2 Flow Rate FiO2


 


2/21/17 07:00 97.8 95 18 85/50 94 Room Air  





 97.8       











Physical Exam


General:  Alert, Oriented X3, Cooperative, No acute distress


Abdomen:  Normal bowel sounds, Soft, No tenderness, No hepatosplenomegaly


Extremities:  No clubbing, No cyanosis


Skin:  No rashes, Other (dry skin,  poor turgor,   )





Labs


LABS





Laboratory Tests








Test


  2/20/17


14:00 2/20/17


15:12 2/20/17


22:14 2/20/17


23:35


 


Influenza Type A Antigen


  Negative


(NEGATIVE) 


  


  


 


 


Influenza Type B Antigen


  Negative


(NEGATIVE) 


  


  


 


 


White Blood Count


  


  4.6x10^3/uL


(4.0-11.0) 


  


 


 


Red Blood Count


  


  5.17x10^6/uL


(4.30-5.70) 


  


 


 


Hemoglobin


  


  14.2g/dL


(13.0-17.5) 


  


 


 


Hematocrit


  


  44.5%


(39.0-53.0) 


  


 


 


Mean Corpuscular Volume  86fL ()   


 


Mean Corpuscular Hemoglobin  27pg (25-35)   


 


Mean Corpuscular Hemoglobin


Concent 


  32g/dL (31-37) 


  


  


 


 


Red Cell Distribution Width


  


  14.0%


(11.5-14.5) 


  


 


 


Platelet Count


  


  156x10^3/uL


(140-400) 


  


 


 


Neutrophils (%) (Auto)  65% (31-73)   


 


Lymphocytes (%) (Auto)  27% (24-48)   


 


Monocytes (%) (Auto)  6% (0-9)   


 


Eosinophils (%) (Auto)  2% (0-3)   


 


Basophils (%) (Auto)  1% (0-3)   


 


Neutrophils # (Auto)


  


  3.0x10^3uL


(1.8-7.7) 


  


 


 


Lymphocytes # (Auto)


  


  1.3x10^3/uL


(1.0-4.8) 


  


 


 


Monocytes # (Auto)


  


  0.3x10^3/uL


(0.0-1.1) 


  


 


 


Eosinophils # (Auto)


  


  0.1x10^3/uL


(0.0-0.7) 


  


 


 


Basophils # (Auto)


  


  0.0x10^3/uL


(0.0-0.2) 


  


 


 


Sodium Level


  


  133mmol/L


(136-145) 133mmol/L


(136-145) 135mmol/L


(136-145)


 


Potassium Level


  


  5.2mmol/L


(3.5-5.1) 5.3mmol/L


(3.5-5.1) 5.0mmol/L


(3.5-5.1)


 


Chloride Level


  


  95mmol/L


() 98mmol/L


() 100mmol/L


()


 


Carbon Dioxide Level


  


  24mmol/L


(21-32) 20mmol/L


(21-32) 22mmol/L


(21-32)


 


Anion Gap  14 (6-14)  15 (6-14)  13 (6-14) 


 


Blood Urea Nitrogen  62mg/dL (8-26)  63mg/dL (8-26)  61mg/dL (8-26) 


 


Creatinine


  


  2.8mg/dL


(0.7-1.3) 2.5mg/dL


(0.7-1.3) 2.5mg/dL


(0.7-1.3)


 


Estimated GFR


(Cockcroft-Gault) 


  26.6 


  30.3 


  30.3 


 


 


Glucose Level


  


  628mg/dL


(70-99) 775mg/dL


(70-99) 666mg/dL


(70-99)


 


Calcium Level


  


  10.2mg/dL


(8.5-10.1) 9.5mg/dL


(8.5-10.1) 9.6mg/dL


(8.5-10.1)














Test


  2/21/17


00:41 2/21/17


04:30 2/21/17


07:57 


 


 


Glucose (Fingerstick)


  507mg/dL


(70-99) 


  78mg/dL


(70-99) 


 


 


White Blood Count


  


  4.9x10^3/uL


(4.0-11.0) 


  


 


 


Red Blood Count


  


  4.83x10^6/uL


(4.30-5.70) 


  


 


 


Hemoglobin


  


  13.2g/dL


(13.0-17.5) 


  


 


 


Hematocrit


  


  40.6%


(39.0-53.0) 


  


 


 


Mean Corpuscular Volume  84fL ()   


 


Mean Corpuscular Hemoglobin  27pg (25-35)   


 


Mean Corpuscular Hemoglobin


Concent 


  33g/dL (31-37) 


  


  


 


 


Red Cell Distribution Width


  


  13.9%


(11.5-14.5) 


  


 


 


Platelet Count


  


  145x10^3/uL


(140-400) 


  


 


 


Neutrophils (%) (Auto)  52% (31-73)   


 


Lymphocytes (%) (Auto)  37% (24-48)   


 


Monocytes (%) (Auto)  8% (0-9)   


 


Eosinophils (%) (Auto)  3% (0-3)   


 


Basophils (%) (Auto)  0% (0-3)   


 


Neutrophils # (Auto)


  


  2.6x10^3uL


(1.8-7.7) 


  


 


 


Lymphocytes # (Auto)


  


  1.8x10^3/uL


(1.0-4.8) 


  


 


 


Monocytes # (Auto)


  


  0.4x10^3/uL


(0.0-1.1) 


  


 


 


Eosinophils # (Auto)


  


  0.2x10^3/uL


(0.0-0.7) 


  


 


 


Basophils # (Auto)


  


  0.0x10^3/uL


(0.0-0.2) 


  


 


 


Sodium Level


  


  144mmol/L


(136-145) 


  


 


 


Potassium Level


  


  3.7mmol/L


(3.5-5.1) 


  


 


 


Chloride Level


  


  107mmol/L


() 


  


 


 


Carbon Dioxide Level


  


  25mmol/L


(21-32) 


  


 


 


Anion Gap  12 (6-14)   


 


Blood Urea Nitrogen  54mg/dL (8-26)   


 


Creatinine


  


  2.1mg/dL


(0.7-1.3) 


  


 


 


Estimated GFR


(Cockcroft-Gault) 


  37.0 


  


  


 


 


BUN/Creatinine Ratio  26 (6-20)   


 


Glucose Level


  


  98mg/dL


(70-99) 


  


 


 


Uric Acid


  


  10.2mg/dL


(3.5-7.2) 


  


 


 


Calcium Level


  


  9.9mg/dL


(8.5-10.1) 


  


 


 


Ionized Calcium


  


  1.32mmol/L


(1.13-1.32) 


  


 


 


Phosphorus Level


  


  2.7mg/dL


(2.6-4.7) 


  


 


 


Magnesium Level


  


  2.1mg/dL


(1.8-2.4) 


  


 


 


Total Bilirubin


  


  0.3mg/dL


(0.2-1.0) 


  


 


 


Aspartate Amino Transf


(AST/SGOT) 


  37U/L (15-37) 


  


  


 


 


Alanine Aminotransferase


(ALT/SGPT) 


  63U/L (16-63) 


  


  


 


 


Alkaline Phosphatase


  


  129U/L


() 


  


 


 


Creatine Kinase


  


  153U/L


() 


  


 


 


Total Protein


  


  7.0g/dL


(6.4-8.2) 


  


 


 


Albumin


  


  3.1g/dL


(3.4-5.0) 


  


 


 


Albumin/Globulin Ratio  0.8 (1.0-1.7)   











Review of Systems


Review of Systems


polyuria, polydipsia, no weight loss, no CP, dysphagia





Assessment and Plan


Assessmemt and Plan


 Problems


Medical Problems:


(1) ARF (acute renal failure)


Status: Acute  





(2) Dysphagia


Status: Acute  





(3) Dysphagia


Status: Acute  





(4) Hyperglycemia


Status: Acute  








Problems:  





Comment


Review of Relevant


I have reviewed the following items emelia (where applicable) has been applied.


Labs





Laboratory Tests








Test


  2/20/17


14:00 2/20/17


15:12 2/20/17


22:14 2/20/17


23:35


 


Influenza Type A Antigen


  Negative


(NEGATIVE) 


  


  


 


 


Influenza Type B Antigen


  Negative


(NEGATIVE) 


  


  


 


 


White Blood Count


  


  4.6x10^3/uL


(4.0-11.0) 


  


 


 


Red Blood Count


  


  5.17x10^6/uL


(4.30-5.70) 


  


 


 


Hemoglobin


  


  14.2g/dL


(13.0-17.5) 


  


 


 


Hematocrit


  


  44.5%


(39.0-53.0) 


  


 


 


Mean Corpuscular Volume  86fL ()   


 


Mean Corpuscular Hemoglobin  27pg (25-35)   


 


Mean Corpuscular Hemoglobin


Concent 


  32g/dL (31-37) 


  


  


 


 


Red Cell Distribution Width


  


  14.0%


(11.5-14.5) 


  


 


 


Platelet Count


  


  156x10^3/uL


(140-400) 


  


 


 


Neutrophils (%) (Auto)  65% (31-73)   


 


Lymphocytes (%) (Auto)  27% (24-48)   


 


Monocytes (%) (Auto)  6% (0-9)   


 


Eosinophils (%) (Auto)  2% (0-3)   


 


Basophils (%) (Auto)  1% (0-3)   


 


Neutrophils # (Auto)


  


  3.0x10^3uL


(1.8-7.7) 


  


 


 


Lymphocytes # (Auto)


  


  1.3x10^3/uL


(1.0-4.8) 


  


 


 


Monocytes # (Auto)


  


  0.3x10^3/uL


(0.0-1.1) 


  


 


 


Eosinophils # (Auto)


  


  0.1x10^3/uL


(0.0-0.7) 


  


 


 


Basophils # (Auto)


  


  0.0x10^3/uL


(0.0-0.2) 


  


 


 


Sodium Level


  


  133mmol/L


(136-145) 133mmol/L


(136-145) 135mmol/L


(136-145)


 


Potassium Level


  


  5.2mmol/L


(3.5-5.1) 5.3mmol/L


(3.5-5.1) 5.0mmol/L


(3.5-5.1)


 


Chloride Level


  


  95mmol/L


() 98mmol/L


() 100mmol/L


()


 


Carbon Dioxide Level


  


  24mmol/L


(21-32) 20mmol/L


(21-32) 22mmol/L


(21-32)


 


Anion Gap  14 (6-14)  15 (6-14)  13 (6-14) 


 


Blood Urea Nitrogen  62mg/dL (8-26)  63mg/dL (8-26)  61mg/dL (8-26) 


 


Creatinine


  


  2.8mg/dL


(0.7-1.3) 2.5mg/dL


(0.7-1.3) 2.5mg/dL


(0.7-1.3)


 


Estimated GFR


(Cockcroft-Gault) 


  26.6 


  30.3 


  30.3 


 


 


Glucose Level


  


  628mg/dL


(70-99) 775mg/dL


(70-99) 666mg/dL


(70-99)


 


Calcium Level


  


  10.2mg/dL


(8.5-10.1) 9.5mg/dL


(8.5-10.1) 9.6mg/dL


(8.5-10.1)














Test


  2/21/17


00:41 2/21/17


04:30 2/21/17


07:57 


 


 


Glucose (Fingerstick)


  507mg/dL


(70-99) 


  78mg/dL


(70-99) 


 


 


White Blood Count


  


  4.9x10^3/uL


(4.0-11.0) 


  


 


 


Red Blood Count


  


  4.83x10^6/uL


(4.30-5.70) 


  


 


 


Hemoglobin


  


  13.2g/dL


(13.0-17.5) 


  


 


 


Hematocrit


  


  40.6%


(39.0-53.0) 


  


 


 


Mean Corpuscular Volume  84fL ()   


 


Mean Corpuscular Hemoglobin  27pg (25-35)   


 


Mean Corpuscular Hemoglobin


Concent 


  33g/dL (31-37) 


  


  


 


 


Red Cell Distribution Width


  


  13.9%


(11.5-14.5) 


  


 


 


Platelet Count


  


  145x10^3/uL


(140-400) 


  


 


 


Neutrophils (%) (Auto)  52% (31-73)   


 


Lymphocytes (%) (Auto)  37% (24-48)   


 


Monocytes (%) (Auto)  8% (0-9)   


 


Eosinophils (%) (Auto)  3% (0-3)   


 


Basophils (%) (Auto)  0% (0-3)   


 


Neutrophils # (Auto)


  


  2.6x10^3uL


(1.8-7.7) 


  


 


 


Lymphocytes # (Auto)


  


  1.8x10^3/uL


(1.0-4.8) 


  


 


 


Monocytes # (Auto)


  


  0.4x10^3/uL


(0.0-1.1) 


  


 


 


Eosinophils # (Auto)


  


  0.2x10^3/uL


(0.0-0.7) 


  


 


 


Basophils # (Auto)


  


  0.0x10^3/uL


(0.0-0.2) 


  


 


 


Sodium Level


  


  144mmol/L


(136-145) 


  


 


 


Potassium Level


  


  3.7mmol/L


(3.5-5.1) 


  


 


 


Chloride Level


  


  107mmol/L


() 


  


 


 


Carbon Dioxide Level


  


  25mmol/L


(21-32) 


  


 


 


Anion Gap  12 (6-14)   


 


Blood Urea Nitrogen  54mg/dL (8-26)   


 


Creatinine


  


  2.1mg/dL


(0.7-1.3) 


  


 


 


Estimated GFR


(Cockcroft-Gault) 


  37.0 


  


  


 


 


BUN/Creatinine Ratio  26 (6-20)   


 


Glucose Level


  


  98mg/dL


(70-99) 


  


 


 


Uric Acid


  


  10.2mg/dL


(3.5-7.2) 


  


 


 


Calcium Level


  


  9.9mg/dL


(8.5-10.1) 


  


 


 


Ionized Calcium


  


  1.32mmol/L


(1.13-1.32) 


  


 


 


Phosphorus Level


  


  2.7mg/dL


(2.6-4.7) 


  


 


 


Magnesium Level


  


  2.1mg/dL


(1.8-2.4) 


  


 


 


Total Bilirubin


  


  0.3mg/dL


(0.2-1.0) 


  


 


 


Aspartate Amino Transf


(AST/SGOT) 


  37U/L (15-37) 


  


  


 


 


Alanine Aminotransferase


(ALT/SGPT) 


  63U/L (16-63) 


  


  


 


 


Alkaline Phosphatase


  


  129U/L


() 


  


 


 


Creatine Kinase


  


  153U/L


() 


  


 


 


Total Protein


  


  7.0g/dL


(6.4-8.2) 


  


 


 


Albumin


  


  3.1g/dL


(3.4-5.0) 


  


 


 


Albumin/Globulin Ratio  0.8 (1.0-1.7)   








Laboratory Tests








Test


  2/20/17


14:00 2/20/17


15:12 2/20/17


22:14 2/20/17


23:35


 


Influenza Type A Antigen


  Negative


(NEGATIVE) 


  


  


 


 


Influenza Type B Antigen


  Negative


(NEGATIVE) 


  


  


 


 


White Blood Count


  


  4.6x10^3/uL


(4.0-11.0) 


  


 


 


Red Blood Count


  


  5.17x10^6/uL


(4.30-5.70) 


  


 


 


Hemoglobin


  


  14.2g/dL


(13.0-17.5) 


  


 


 


Hematocrit


  


  44.5%


(39.0-53.0) 


  


 


 


Mean Corpuscular Volume  86fL ()   


 


Mean Corpuscular Hemoglobin  27pg (25-35)   


 


Mean Corpuscular Hemoglobin


Concent 


  32g/dL (31-37) 


  


  


 


 


Red Cell Distribution Width


  


  14.0%


(11.5-14.5) 


  


 


 


Platelet Count


  


  156x10^3/uL


(140-400) 


  


 


 


Neutrophils (%) (Auto)  65% (31-73)   


 


Lymphocytes (%) (Auto)  27% (24-48)   


 


Monocytes (%) (Auto)  6% (0-9)   


 


Eosinophils (%) (Auto)  2% (0-3)   


 


Basophils (%) (Auto)  1% (0-3)   


 


Neutrophils # (Auto)


  


  3.0x10^3uL


(1.8-7.7) 


  


 


 


Lymphocytes # (Auto)


  


  1.3x10^3/uL


(1.0-4.8) 


  


 


 


Monocytes # (Auto)


  


  0.3x10^3/uL


(0.0-1.1) 


  


 


 


Eosinophils # (Auto)


  


  0.1x10^3/uL


(0.0-0.7) 


  


 


 


Basophils # (Auto)


  


  0.0x10^3/uL


(0.0-0.2) 


  


 


 


Sodium Level


  


  133mmol/L


(136-145) 133mmol/L


(136-145) 135mmol/L


(136-145)


 


Potassium Level


  


  5.2mmol/L


(3.5-5.1) 5.3mmol/L


(3.5-5.1) 5.0mmol/L


(3.5-5.1)


 


Chloride Level


  


  95mmol/L


() 98mmol/L


() 100mmol/L


()


 


Carbon Dioxide Level


  


  24mmol/L


(21-32) 20mmol/L


(21-32) 22mmol/L


(21-32)


 


Anion Gap  14 (6-14)  15 (6-14)  13 (6-14) 


 


Blood Urea Nitrogen  62mg/dL (8-26)  63mg/dL (8-26)  61mg/dL (8-26) 


 


Creatinine


  


  2.8mg/dL


(0.7-1.3) 2.5mg/dL


(0.7-1.3) 2.5mg/dL


(0.7-1.3)


 


Estimated GFR


(Cockcroft-Gault) 


  26.6 


  30.3 


  30.3 


 


 


Glucose Level


  


  628mg/dL


(70-99) 775mg/dL


(70-99) 666mg/dL


(70-99)


 


Calcium Level


  


  10.2mg/dL


(8.5-10.1) 9.5mg/dL


(8.5-10.1) 9.6mg/dL


(8.5-10.1)














Test


  2/21/17


00:41 2/21/17


04:30 2/21/17


07:57 


 


 


Glucose (Fingerstick)


  507mg/dL


(70-99) 


  78mg/dL


(70-99) 


 


 


White Blood Count


  


  4.9x10^3/uL


(4.0-11.0) 


  


 


 


Red Blood Count


  


  4.83x10^6/uL


(4.30-5.70) 


  


 


 


Hemoglobin


  


  13.2g/dL


(13.0-17.5) 


  


 


 


Hematocrit


  


  40.6%


(39.0-53.0) 


  


 


 


Mean Corpuscular Volume  84fL ()   


 


Mean Corpuscular Hemoglobin  27pg (25-35)   


 


Mean Corpuscular Hemoglobin


Concent 


  33g/dL (31-37) 


  


  


 


 


Red Cell Distribution Width


  


  13.9%


(11.5-14.5) 


  


 


 


Platelet Count


  


  145x10^3/uL


(140-400) 


  


 


 


Neutrophils (%) (Auto)  52% (31-73)   


 


Lymphocytes (%) (Auto)  37% (24-48)   


 


Monocytes (%) (Auto)  8% (0-9)   


 


Eosinophils (%) (Auto)  3% (0-3)   


 


Basophils (%) (Auto)  0% (0-3)   


 


Neutrophils # (Auto)


  


  2.6x10^3uL


(1.8-7.7) 


  


 


 


Lymphocytes # (Auto)


  


  1.8x10^3/uL


(1.0-4.8) 


  


 


 


Monocytes # (Auto)


  


  0.4x10^3/uL


(0.0-1.1) 


  


 


 


Eosinophils # (Auto)


  


  0.2x10^3/uL


(0.0-0.7) 


  


 


 


Basophils # (Auto)


  


  0.0x10^3/uL


(0.0-0.2) 


  


 


 


Sodium Level


  


  144mmol/L


(136-145) 


  


 


 


Potassium Level


  


  3.7mmol/L


(3.5-5.1) 


  


 


 


Chloride Level


  


  107mmol/L


() 


  


 


 


Carbon Dioxide Level


  


  25mmol/L


(21-32) 


  


 


 


Anion Gap  12 (6-14)   


 


Blood Urea Nitrogen  54mg/dL (8-26)   


 


Creatinine


  


  2.1mg/dL


(0.7-1.3) 


  


 


 


Estimated GFR


(Cockcroft-Gault) 


  37.0 


  


  


 


 


BUN/Creatinine Ratio  26 (6-20)   


 


Glucose Level


  


  98mg/dL


(70-99) 


  


 


 


Uric Acid


  


  10.2mg/dL


(3.5-7.2) 


  


 


 


Calcium Level


  


  9.9mg/dL


(8.5-10.1) 


  


 


 


Ionized Calcium


  


  1.32mmol/L


(1.13-1.32) 


  


 


 


Phosphorus Level


  


  2.7mg/dL


(2.6-4.7) 


  


 


 


Magnesium Level


  


  2.1mg/dL


(1.8-2.4) 


  


 


 


Total Bilirubin


  


  0.3mg/dL


(0.2-1.0) 


  


 


 


Aspartate Amino Transf


(AST/SGOT) 


  37U/L (15-37) 


  


  


 


 


Alanine Aminotransferase


(ALT/SGPT) 


  63U/L (16-63) 


  


  


 


 


Alkaline Phosphatase


  


  129U/L


() 


  


 


 


Creatine Kinase


  


  153U/L


() 


  


 


 


Total Protein


  


  7.0g/dL


(6.4-8.2) 


  


 


 


Albumin


  


  3.1g/dL


(3.4-5.0) 


  


 


 


Albumin/Globulin Ratio  0.8 (1.0-1.7)   








Medications





 Current Medications


Sodium Chloride 500 ml @  500 mls/hr 1X  ONCE IV  Last administered on 2/20/ 17at 15:30;  Start 2/20/17 at 15:30;  Stop 2/20/17 at 16:29;  Status DC


Sodium Chloride (Iv Sodium Chloride 0.9% 1000ml Bag) 1,000 ml @  510 mls/hr 

Q1H58M IV ;  Start 2/20/17 at 17:15;  Stop 2/20/17 at 21:15;  Status DC


Ondansetron HCl 4 mg 4 mg PRN Q8HRS  PRN IV NAUSEA/VOMITING;  Start 2/20/17 at 

17:15;  Stop 2/21/17 at 17:14


Sodium Chloride (Iv Sodium Chloride 0.9% 1000ml Bag) 1,000 ml @  125 mls/hr Q8H 

IV  Last administered on 2/21/17at 02:30;  Start 2/20/17 at 17:04;  Stop 2/21/ 17 at 17:03


Acetaminophen (Tylenol) 650 mg PRN Q4HRS  PRN PO FEVER;  Start 2/20/17 at 17:15

;  Stop 2/21/17 at 17:14


Insulin Aspart (Novolog) 0-9 UNITS TIDWMEALS SQ ;  Start 2/21/17 at 08:00


Dextrose 12.5 gm PRN Q15MIN  PRN IV SEE COMMENTS;  Start 2/20/17 at 21:45


Insulin Aspart (Novolog) 5 units TIDAC SQ ;  Start 2/21/17 at 07:30;  Stop 2/21/ 17 at 07:30;  Status DC


Insulin Detemir (Levemir) 10 units QHS SQ  Last administered on 2/20/17at 22:37

;  Start 2/20/17 at 22:00;  Stop 2/21/17 at 00:57;  Status DC


Insulin Aspart (Novolog) 10 units 1X  ONCE SQ  Last administered on 2/20/17at 22

:36;  Start 2/20/17 at 22:30;  Stop 2/20/17 at 22:31;  Status DC


Potassium Chloride (Klor-Con) 20 meq [id number] PO  Last administered on 2/21/ 17at 10:03;  Start 2/21/17 at 08:00


Enoxaparin Sodium (Lovenox Per Pharmacy Prophylaxis Dosing) 1 each PRN DAILY  

PRN MC SEE COMMENTS;  Start 2/20/17 at 22:00


Enoxaparin Sodium (Lovenox 30mg Syringe) 30 mg QHS SQ  Last administered on 2/20 /17at 22:38;  Start 2/20/17 at 23:00


Insulin Aspart (Novolog) 15 units 1X  ONCE SQ  Last administered on 2/21/17at 00

:11;  Start 2/21/17 at 00:30;  Stop 2/21/17 at 00:31;  Status DC


Insulin Aspart (Novolog) 15 units TIDAC SQ ;  Start 2/21/17 at 07:30;  Stop 2/21 /17 at 09:36;  Status DC


Insulin Detemir (Levemir) 25 units QHS SQ ;  Start 2/21/17 at 21:00;  Stop 2/21/ 17 at 21:00;  Status DC


Insulin Aspart (Novolog) 17 units 1X  ONCE SQ  Last administered on 2/21/17at 01

:28;  Start 2/21/17 at 01:15;  Stop 2/21/17 at 01:16;  Status DC


Info (Do NOT chart on this placeholder) 0.5 each 1X  ONCE MC ;  Start 2/21/17 

at 02:45;  Stop 2/21/17 at 02:46;  Status UNV


Pneumococcal Polyvalent Vaccine (Do NOT chart on this placeholder) 0.5 each 1X  

ONCE MC ;  Start 2/21/17 at 02:45;  Stop 2/21/17 at 02:46;  Status UNV


Influenza Virus Vaccine Quadrival (Fluarix Quad 9731-4500 Syringe) 0.5 ml ONCE 

ONCE VAX IM  Last administered on 2/21/17at 09:32;  Start 2/21/17 at 09:00;  

Stop 2/21/17 at 09:04;  Status DC


Pneumococcal Polyvalent Vaccine (Pneumovax 23) 0.5 ml ONCE ONCE VAX IM  Last 

administered on 2/21/17at 09:31;  Start 2/21/17 at 09:00;  Stop 2/21/17 at 09:04

;  Status DC


Celecoxib (Celebrex) 200 mg PRN BID  PRN PO PAIN;  Start 2/21/17 at 09:45


Cyclobenzaprine HCl (Flexeril) 10 mg PRN TID  PRN PO SPASMS;  Start 2/21/17 at 

09:45


Acetaminophen/ Hydrocodone Bitart (Lortab 5/325) 1 tab PRN Q4HRS  PRN PO 

MODERATE PAIN;  Start 2/21/17 at 09:45


Losartan Potassium (Cozaar) 50 mg DAILY PO ;  Start 2/22/17 at 09:00;  Stop 2/22 /17 at 09:00;  Status DC


Hydrochlorothiazide 12.5 mg 12.5 mg DAILY PO ;  Start 2/22/17 at 09:00;  Stop 2/ 22/17 at 09:00;  Status DC


Magnesium Sulfate/ Dextrose (Magnesium Sulfate PREMIX 2GM) 50 ml @ 25 mls/hr 

PRN DAILY  PRN IV for Mag < 1.7 on am labs;  Start 2/21/17 at 11:00





Active Scripts


Active


Reported


Cyclobenzaprine Hcl 10 Mg Tablet   


Omeprazole 20 Mg Capsule.   


Celecoxib 200 Mg Capsule   


Hydrocodone-Apap 5-300 (Hydrocodone Bit/Acetaminophen) 1 Each Tablet   


Losartan-Hctz 50-12.5 Mg Tab (Losartan/Hydrochlorothiazide) 1 Each Tablet   


Novolog Flexpen (Insulin Aspart) 100 Unit/1 Ml Insuln.pen 15 Unit SQ 1X


Vitals/I & O





 Vital Sign - Last 24 Hours








 2/20/17 2/20/17 2/20/17 2/20/17





 14:06 15:00 15:30 16:00


 


Temp 98.0   





 98.0   


 


Pulse 82  94 86


 


Resp 16   


 


B/P 88/66 90/63 87/60 98/60


 


Pulse Ox 97 96 97 97


 


O2 Delivery Room Air Room Air Room Air Room Air


 


    





    





 2/20/17 2/20/17 2/20/17 2/20/17





 16:30 17:00 18:00 18:30


 


Pulse 90 52 94 96


 


B/P 104/71 108/77 122/79 111/72


 


Pulse Ox 97 98 96 95


 


O2 Delivery Room Air Room Air Room Air Room Air





 2/20/17 2/20/17 2/21/17 2/21/17





 20:00 23:00 02:06 03:00


 


Temp 97.7 97.5  97.5





 97.7 97.5  97.5


 


Pulse 87 90  88


 


Resp 16 18  


 


B/P 122/69 85/56  112/


 


Pulse Ox 97 97  96


 


O2 Delivery Room Air Room Air Room Air Room Air


 


    





    





 2/21/17   





 07:00   


 


Temp 97.8   





 97.8   


 


Pulse 95   


 


Resp 18   


 


B/P 85/50   


 


Pulse Ox 94   


 


O2 Delivery Room Air   














 Intake and Output   


 


 2/20/17 2/20/17 2/21/17





 15:00 23:00 07:00


 


Intake Total  500 ml 700 ml


 


Balance  500 ml 700 ml














WOODROW GARCIA MD Feb 21, 2017 12:09

## 2017-02-21 NOTE — RAD
EXAM: Renal/retroperitonal ultrasound



HISTORY: Acute on chronic renal failure.



COMPARISON: None.



FINDINGS: Ultrasound of the kidneys, bladder and retroperitoneum was

performed.



The right kidney measures at least 7.9 cm. This may be an underestimate.

Cortical thickness are not clearly decreased. The sonographer suggests

cortical hyperechogenicity on real-time scanning but this is not clearly seen

on these images. There is no hydronephrosis.     



The left kidney measures at least 8.8 cm. Cortical thickness is preserved. The

sonographer suggests increased renal cortical echogenicity but this is not

clearly seen on these images. There is no hydronephrosis. A benign cyst in the

interpolar region measures 1.6 x 1.7 cm.



The prostate is enlarged measuring 4.8 x 3.8 cm. It impresses mildly on the

bladder base. There is diffuse bladder wall thickening.



IMPRESSION:

1. The kidneys measure small consistent with atrophy. No hydronephrosis.

2. Benign 1.7 left renal cyst.

3. Prostatic hypertrophy. Diffuse bladder wall thickening indicates chronic

outlet obstruction or inflammation.

## 2017-02-21 NOTE — PDOC2
CONSULT


Date of Consult


Date of Consult


DATE: 2/21/17 


TIME: 10:47





Reason for Consult


Reason for Consult:


MAYANK





Referring Physician


Referring Physician:


Dr Larose





Identification/Chief Complaint


Chief Complaint


sev Gen weakness


Problems:  





Source


Source:  Patient





History of Present Illness


Reason for Visit:


as dictated





Past Medical History


Cardiovascular:  No pertinent hx


Pulmonary:  No pertinent hx


GI:  No pertinent hx


Heme/Onc:  No pertinent hx


Hepatobiliary:  No pertinent hx


Endocrine:  No pertinent hx


Dermatology:  No pertinent hx





Social History


No


ALCOHOL:  none


Drugs:  None





Current Problem List


Problem List


 Problems


Medical Problems:


(1) ARF (acute renal failure)


Status: Acute  





(2) Dysphagia


Status: Acute  





(3) Dysphagia


Status: Acute  





(4) Hyperglycemia


Status: Acute  











Current Medications


Current Medications





 Current Medications


Sodium Chloride 500 ml @  500 mls/hr 1X  ONCE IV  Last administered on 2/20/ 17at 15:30;  Start 2/20/17 at 15:30;  Stop 2/20/17 at 16:29;  Status DC


Sodium Chloride (Iv Sodium Chloride 0.9% 1000ml Bag) 1,000 ml @  510 mls/hr 

Q1H58M IV ;  Start 2/20/17 at 17:15;  Stop 2/20/17 at 21:15;  Status DC


Ondansetron HCl 4 mg 4 mg PRN Q8HRS  PRN IV NAUSEA/VOMITING;  Start 2/20/17 at 

17:15;  Stop 2/21/17 at 17:14


Sodium Chloride (Iv Sodium Chloride 0.9% 1000ml Bag) 1,000 ml @  125 mls/hr Q8H 

IV  Last administered on 2/21/17at 02:30;  Start 2/20/17 at 17:04;  Stop 2/21/ 17 at 17:03


Acetaminophen (Tylenol) 650 mg PRN Q4HRS  PRN PO FEVER;  Start 2/20/17 at 17:15

;  Stop 2/21/17 at 17:14


Insulin Aspart (Novolog) 0-9 UNITS TIDWMEALS SQ ;  Start 2/21/17 at 08:00


Dextrose 12.5 gm PRN Q15MIN  PRN IV SEE COMMENTS;  Start 2/20/17 at 21:45


Insulin Aspart (Novolog) 5 units TIDAC SQ ;  Start 2/21/17 at 07:30;  Stop 2/21/ 17 at 07:30;  Status DC


Insulin Detemir (Levemir) 10 units QHS SQ  Last administered on 2/20/17at 22:37

;  Start 2/20/17 at 22:00;  Stop 2/21/17 at 00:57;  Status DC


Insulin Aspart (Novolog) 10 units 1X  ONCE SQ  Last administered on 2/20/17at 22

:36;  Start 2/20/17 at 22:30;  Stop 2/20/17 at 22:31;  Status DC


Potassium Chloride (Klor-Con) 20 meq DAILYWBKFT PO  Last administered on 2/21/ 17at 10:03;  Start 2/21/17 at 08:00


Enoxaparin Sodium (Lovenox Per Pharmacy Prophylaxis Dosing) 1 each PRN DAILY  

PRN MC SEE COMMENTS;  Start 2/20/17 at 22:00


Enoxaparin Sodium (Lovenox 30mg Syringe) 30 mg QHS SQ  Last administered on 2/20 /17at 22:38;  Start 2/20/17 at 23:00


Insulin Aspart (Novolog) 15 units 1X  ONCE SQ  Last administered on 2/21/17at 00

:11;  Start 2/21/17 at 00:30;  Stop 2/21/17 at 00:31;  Status DC


Insulin Aspart (Novolog) 15 units TIDAC SQ ;  Start 2/21/17 at 07:30;  Stop 2/21 /17 at 09:36;  Status DC


Insulin Detemir (Levemir) 25 units QHS SQ ;  Start 2/21/17 at 21:00;  Stop 2/21/ 17 at 21:00;  Status DC


Insulin Aspart (Novolog) 17 units 1X  ONCE SQ  Last administered on 2/21/17at 01

:28;  Start 2/21/17 at 01:15;  Stop 2/21/17 at 01:16;  Status DC


Info (Do NOT chart on this placeholder) 0.5 each 1X  ONCE MC ;  Start 2/21/17 

at 02:45;  Stop 2/21/17 at 02:46;  Status UNV


Pneumococcal Polyvalent Vaccine (Do NOT chart on this placeholder) 0.5 each 1X  

ONCE MC ;  Start 2/21/17 at 02:45;  Stop 2/21/17 at 02:46;  Status UNV


Influenza Virus Vaccine Quadrival (Fluarix Quad [id number] Syringe) 0.5 ml ONCE 

ONCE VAX IM  Last administered on 2/21/17at 09:32;  Start 2/21/17 at 09:00;  

Stop 2/21/17 at 09:04;  Status DC


Pneumococcal Polyvalent Vaccine (Pneumovax 23) 0.5 ml ONCE ONCE VAX IM  Last 

administered on 2/21/17at 09:31;  Start 2/21/17 at 09:00;  Stop 2/21/17 at 09:04

;  Status DC


Celecoxib (Celebrex) 200 mg PRN BID  PRN PO PAIN;  Start 2/21/17 at 09:45


Cyclobenzaprine HCl (Flexeril) 10 mg PRN TID  PRN PO SPASMS;  Start 2/21/17 at 

09:45


Acetaminophen/ Hydrocodone Bitart (Lortab 5/325) 1 tab PRN Q4HRS  PRN PO 

MODERATE PAIN;  Start 2/21/17 at 09:45


Losartan Potassium (Cozaar) 50 mg DAILY PO ;  Start 2/22/17 at 09:00;  Stop 2/22 /17 at 09:00;  Status DC


Hydrochlorothiazide (Microzide) 12.5 mg DAILY PO ;  Start 2/22/17 at 09:00;  

Stop 2/22/17 at 09:00;  Status DC





Active Scripts


Active


Reported


Cyclobenzaprine Hcl 10 Mg Tablet   


Omeprazole 20 Mg Capsule.   


Celecoxib 200 Mg Capsule   


Hydrocodone-Apap 5-300 (Hydrocodone Bit/Acetaminophen) 1 Each Tablet   


Losartan-Hctz 50-12.5 Mg Tab (Losartan/Hydrochlorothiazide) 1 Each Tablet   


Novolog Flexpen (Insulin Aspart) 100 Unit/1 Ml Insuln.pen 15 Unit SQ 1X





Allergies


Allergies:  


Coded Allergies:  


     No Known Drug Allergies (Unverified , 6/29/16)





ROS


Review of System


   GEN:      no Fevers      no Chills   + Gen Weakness   ++ Fatigue


   EYES:      + Visual Complaints with Blurry vision - now improvign 


   ENT:      no EN Drainage      no Hearing deficiets


   CVS:      no Orthopnea      no CP


   RESP:      no SOB      no MACE


   GI:      + Nausea      +  Vomiting


   :        Dysuria   no   no Urgency+ Freq


   HEME:        easy bruising      no Palp Ly Nodes


   NEURO      no Focal Weakness   no Sz


   PSYCH:   no Suicidal Ideation   no Depression


   SKIN:      no Rashes


   ENDO:      + Polyuria or Polydipsia   no Hot/Cold Intolerance


   MU SK:      occ  Arthraigia      min Myalgia





Physical Exam


Physical Exam





General Appearance:       Awake       Alert Oriented x  3   In  no  Distress


Eyes:       VIsion Unchanged Conjunctiva Normal


EN:       No EN Drainage  Mucous Memb.    moist


Neck:         no  JVD   no  JVP  Supple   no  Thyromegaly


CVS:       S1 S2   no  Murmur  No Gallop  No Rub   no Edema


Resp:        no  Rales   no  Rhonchi   no  Acc. Muscle use


GI:       BAS +ve    NO Bruit   Non Tender   Non Distended


:        no  CVA tenderness;    no  Suprapubic Tenderness


SKIN:        no  Rashes  Breast Exam deferred


Mu.Sk:       Adequate ROM    min  Muscle Atrophy


Heme:       Unable to palpate Obvious LAD  no palp   Splenomegaly


NEURO:       Good Strength and Tone   Cranial Nerves II - XII grossly intact


Psych:        not   Depressed    no  Active hallucination


Vital Signs





 Vital Signs








  Date Time  Temp Pulse Resp B/P Pulse Ox O2 Delivery O2 Flow Rate FiO2


 


2/21/17 07:00 97.8 95 18 85/50 94 Room Air  





 97.8       








Assessment & Plan


MAYANK - Presumed VMN due to dehydration from ^ed FSBS.    Current FLuid and E-

lyte status does not necessitate emergent need for Dialysis.  Will re-evaluate 

for Dialysis in am





Hypotension - suspect due to dehydration





hyponatremia - Pseudohyponatremia due to ^ FSBS; Doubt that the pulm ABN seen 

on CXR is a reason but cannot be ruled out





^K - due to ^ed FSBS / HONK state and nOw well corrected





Dehydration - suspect due to Glycosuria - better wtih IVF already





Discussed Plan of Care and prognosis etc. at length with family.





Labs


Labs





Laboratory Tests








Test


  2/20/17


14:00 2/20/17


15:12 2/20/17


22:14 2/20/17


23:35


 


Influenza Type A Antigen


  Negative


(NEGATIVE) 


  


  


 


 


Influenza Type B Antigen


  Negative


(NEGATIVE) 


  


  


 


 


White Blood Count


  


  4.6x10^3/uL


(4.0-11.0) 


  


 


 


Red Blood Count


  


  5.17x10^6/uL


(4.30-5.70) 


  


 


 


Hemoglobin


  


  14.2g/dL


(13.0-17.5) 


  


 


 


Hematocrit


  


  44.5%


(39.0-53.0) 


  


 


 


Mean Corpuscular Volume  86fL ()   


 


Mean Corpuscular Hemoglobin  27pg (25-35)   


 


Mean Corpuscular Hemoglobin


Concent 


  32g/dL (31-37) 


  


  


 


 


Red Cell Distribution Width


  


  14.0%


(11.5-14.5) 


  


 


 


Platelet Count


  


  156x10^3/uL


(140-400) 


  


 


 


Neutrophils (%) (Auto)  65% (31-73)   


 


Lymphocytes (%) (Auto)  27% (24-48)   


 


Monocytes (%) (Auto)  6% (0-9)   


 


Eosinophils (%) (Auto)  2% (0-3)   


 


Basophils (%) (Auto)  1% (0-3)   


 


Neutrophils # (Auto)


  


  3.0x10^3uL


(1.8-7.7) 


  


 


 


Lymphocytes # (Auto)


  


  1.3x10^3/uL


(1.0-4.8) 


  


 


 


Monocytes # (Auto)


  


  0.3x10^3/uL


(0.0-1.1) 


  


 


 


Eosinophils # (Auto)


  


  0.1x10^3/uL


(0.0-0.7) 


  


 


 


Basophils # (Auto)


  


  0.0x10^3/uL


(0.0-0.2) 


  


 


 


Sodium Level


  


  133mmol/L


(136-145) 133mmol/L


(136-145) 135mmol/L


(136-145)


 


Potassium Level


  


  5.2mmol/L


(3.5-5.1) 5.3mmol/L


(3.5-5.1) 5.0mmol/L


(3.5-5.1)


 


Chloride Level


  


  95mmol/L


() 98mmol/L


() 100mmol/L


()


 


Carbon Dioxide Level


  


  24mmol/L


(21-32) 20mmol/L


(21-32) 22mmol/L


(21-32)


 


Anion Gap  14 (6-14)  15 (6-14)  13 (6-14) 


 


Blood Urea Nitrogen  62mg/dL (8-26)  63mg/dL (8-26)  61mg/dL (8-26) 


 


Creatinine


  


  2.8mg/dL


(0.7-1.3) 2.5mg/dL


(0.7-1.3) 2.5mg/dL


(0.7-1.3)


 


Estimated GFR


(Cockcroft-Gault) 


  26.6 


  30.3 


  30.3 


 


 


Glucose Level


  


  628mg/dL


(70-99) 775mg/dL


(70-99) 666mg/dL


(70-99)


 


Calcium Level


  


  10.2mg/dL


(8.5-10.1) 9.5mg/dL


(8.5-10.1) 9.6mg/dL


(8.5-10.1)














Test


  2/21/17


00:41 2/21/17


04:30 2/21/17


07:57 


 


 


Glucose (Fingerstick)


  507mg/dL


(70-99) 


  78mg/dL


(70-99) 


 


 


White Blood Count


  


  4.9x10^3/uL


(4.0-11.0) 


  


 


 


Red Blood Count


  


  4.83x10^6/uL


(4.30-5.70) 


  


 


 


Hemoglobin


  


  13.2g/dL


(13.0-17.5) 


  


 


 


Hematocrit


  


  40.6%


(39.0-53.0) 


  


 


 


Mean Corpuscular Volume  84fL ()   


 


Mean Corpuscular Hemoglobin  27pg (25-35)   


 


Mean Corpuscular Hemoglobin


Concent 


  33g/dL (31-37) 


  


  


 


 


Red Cell Distribution Width


  


  13.9%


(11.5-14.5) 


  


 


 


Platelet Count


  


  145x10^3/uL


(140-400) 


  


 


 


Neutrophils (%) (Auto)  52% (31-73)   


 


Lymphocytes (%) (Auto)  37% (24-48)   


 


Monocytes (%) (Auto)  8% (0-9)   


 


Eosinophils (%) (Auto)  3% (0-3)   


 


Basophils (%) (Auto)  0% (0-3)   


 


Neutrophils # (Auto)


  


  2.6x10^3uL


(1.8-7.7) 


  


 


 


Lymphocytes # (Auto)


  


  1.8x10^3/uL


(1.0-4.8) 


  


 


 


Monocytes # (Auto)


  


  0.4x10^3/uL


(0.0-1.1) 


  


 


 


Eosinophils # (Auto)


  


  0.2x10^3/uL


(0.0-0.7) 


  


 


 


Basophils # (Auto)


  


  0.0x10^3/uL


(0.0-0.2) 


  


 


 


Sodium Level


  


  144mmol/L


(136-145) 


  


 


 


Potassium Level


  


  3.7mmol/L


(3.5-5.1) 


  


 


 


Chloride Level


  


  107mmol/L


() 


  


 


 


Carbon Dioxide Level


  


  25mmol/L


(21-32) 


  


 


 


Anion Gap  12 (6-14)   


 


Blood Urea Nitrogen  54mg/dL (8-26)   


 


Creatinine


  


  2.1mg/dL


(0.7-1.3) 


  


 


 


Estimated GFR


(Cockcroft-Gault) 


  37.0 


  


  


 


 


BUN/Creatinine Ratio  26 (6-20)   


 


Glucose Level


  


  98mg/dL


(70-99) 


  


 


 


Calcium Level


  


  9.9mg/dL


(8.5-10.1) 


  


 


 


Ionized Calcium


  


  1.32mmol/L


(1.13-1.32) 


  


 


 


Phosphorus Level


  


  2.7mg/dL


(2.6-4.7) 


  


 


 


Magnesium Level


  


  2.1mg/dL


(1.8-2.4) 


  


 


 


Total Bilirubin


  


  0.3mg/dL


(0.2-1.0) 


  


 


 


Aspartate Amino Transf


(AST/SGOT) 


  37U/L (15-37) 


  


  


 


 


Alanine Aminotransferase


(ALT/SGPT) 


  63U/L (16-63) 


  


  


 


 


Alkaline Phosphatase


  


  129U/L


() 


  


 


 


Total Protein


  


  7.0g/dL


(6.4-8.2) 


  


 


 


Albumin


  


  3.1g/dL


(3.4-5.0) 


  


 


 


Albumin/Globulin Ratio  0.8 (1.0-1.7)   








Laboratory Tests








Test


  2/20/17


14:00 2/20/17


15:12 2/20/17


22:14 2/20/17


23:35


 


Influenza Type A Antigen


  Negative


(NEGATIVE) 


  


  


 


 


Influenza Type B Antigen


  Negative


(NEGATIVE) 


  


  


 


 


White Blood Count


  


  4.6x10^3/uL


(4.0-11.0) 


  


 


 


Red Blood Count


  


  5.17x10^6/uL


(4.30-5.70) 


  


 


 


Hemoglobin


  


  14.2g/dL


(13.0-17.5) 


  


 


 


Hematocrit


  


  44.5%


(39.0-53.0) 


  


 


 


Mean Corpuscular Volume  86fL ()   


 


Mean Corpuscular Hemoglobin  27pg (25-35)   


 


Mean Corpuscular Hemoglobin


Concent 


  32g/dL (31-37) 


  


  


 


 


Red Cell Distribution Width


  


  14.0%


(11.5-14.5) 


  


 


 


Platelet Count


  


  156x10^3/uL


(140-400) 


  


 


 


Neutrophils (%) (Auto)  65% (31-73)   


 


Lymphocytes (%) (Auto)  27% (24-48)   


 


Monocytes (%) (Auto)  6% (0-9)   


 


Eosinophils (%) (Auto)  2% (0-3)   


 


Basophils (%) (Auto)  1% (0-3)   


 


Neutrophils # (Auto)


  


  3.0x10^3uL


(1.8-7.7) 


  


 


 


Lymphocytes # (Auto)


  


  1.3x10^3/uL


(1.0-4.8) 


  


 


 


Monocytes # (Auto)


  


  0.3x10^3/uL


(0.0-1.1) 


  


 


 


Eosinophils # (Auto)


  


  0.1x10^3/uL


(0.0-0.7) 


  


 


 


Basophils # (Auto)


  


  0.0x10^3/uL


(0.0-0.2) 


  


 


 


Sodium Level


  


  133mmol/L


(136-145) 133mmol/L


(136-145) 135mmol/L


(136-145)


 


Potassium Level


  


  5.2mmol/L


(3.5-5.1) 5.3mmol/L


(3.5-5.1) 5.0mmol/L


(3.5-5.1)


 


Chloride Level


  


  95mmol/L


() 98mmol/L


() 100mmol/L


()


 


Carbon Dioxide Level


  


  24mmol/L


(21-32) 20mmol/L


(21-32) 22mmol/L


(21-32)


 


Anion Gap  14 (6-14)  15 (6-14)  13 (6-14) 


 


Blood Urea Nitrogen  62mg/dL (8-26)  63mg/dL (8-26)  61mg/dL (8-26) 


 


Creatinine


  


  2.8mg/dL


(0.7-1.3) 2.5mg/dL


(0.7-1.3) 2.5mg/dL


(0.7-1.3)


 


Estimated GFR


(Cockcroft-Gault) 


  26.6 


  30.3 


  30.3 


 


 


Glucose Level


  


  628mg/dL


(70-99) 775mg/dL


(70-99) 666mg/dL


(70-99)


 


Calcium Level


  


  10.2mg/dL


(8.5-10.1) 9.5mg/dL


(8.5-10.1) 9.6mg/dL


(8.5-10.1)














Test


  2/21/17


00:41 2/21/17


04:30 2/21/17


07:57 


 


 


Glucose (Fingerstick)


  507mg/dL


(70-99) 


  78mg/dL


(70-99) 


 


 


White Blood Count


  


  4.9x10^3/uL


(4.0-11.0) 


  


 


 


Red Blood Count


  


  4.83x10^6/uL


(4.30-5.70) 


  


 


 


Hemoglobin


  


  13.2g/dL


(13.0-17.5) 


  


 


 


Hematocrit


  


  40.6%


(39.0-53.0) 


  


 


 


Mean Corpuscular Volume  84fL ()   


 


Mean Corpuscular Hemoglobin  27pg (25-35)   


 


Mean Corpuscular Hemoglobin


Concent 


  33g/dL (31-37) 


  


  


 


 


Red Cell Distribution Width


  


  13.9%


(11.5-14.5) 


  


 


 


Platelet Count


  


  145x10^3/uL


(140-400) 


  


 


 


Neutrophils (%) (Auto)  52% (31-73)   


 


Lymphocytes (%) (Auto)  37% (24-48)   


 


Monocytes (%) (Auto)  8% (0-9)   


 


Eosinophils (%) (Auto)  3% (0-3)   


 


Basophils (%) (Auto)  0% (0-3)   


 


Neutrophils # (Auto)


  


  2.6x10^3uL


(1.8-7.7) 


  


 


 


Lymphocytes # (Auto)


  


  1.8x10^3/uL


(1.0-4.8) 


  


 


 


Monocytes # (Auto)


  


  0.4x10^3/uL


(0.0-1.1) 


  


 


 


Eosinophils # (Auto)


  


  0.2x10^3/uL


(0.0-0.7) 


  


 


 


Basophils # (Auto)


  


  0.0x10^3/uL


(0.0-0.2) 


  


 


 


Sodium Level


  


  144mmol/L


(136-145) 


  


 


 


Potassium Level


  


  3.7mmol/L


(3.5-5.1) 


  


 


 


Chloride Level


  


  107mmol/L


() 


  


 


 


Carbon Dioxide Level


  


  25mmol/L


(21-32) 


  


 


 


Anion Gap  12 (6-14)   


 


Blood Urea Nitrogen  54mg/dL (8-26)   


 


Creatinine


  


  2.1mg/dL


(0.7-1.3) 


  


 


 


Estimated GFR


(Cockcroft-Gault) 


  37.0 


  


  


 


 


BUN/Creatinine Ratio  26 (6-20)   


 


Glucose Level


  


  98mg/dL


(70-99) 


  


 


 


Calcium Level


  


  9.9mg/dL


(8.5-10.1) 


  


 


 


Ionized Calcium


  


  1.32mmol/L


(1.13-1.32) 


  


 


 


Phosphorus Level


  


  2.7mg/dL


(2.6-4.7) 


  


 


 


Magnesium Level


  


  2.1mg/dL


(1.8-2.4) 


  


 


 


Total Bilirubin


  


  0.3mg/dL


(0.2-1.0) 


  


 


 


Aspartate Amino Transf


(AST/SGOT) 


  37U/L (15-37) 


  


  


 


 


Alanine Aminotransferase


(ALT/SGPT) 


  63U/L (16-63) 


  


  


 


 


Alkaline Phosphatase


  


  129U/L


() 


  


 


 


Total Protein


  


  7.0g/dL


(6.4-8.2) 


  


 


 


Albumin


  


  3.1g/dL


(3.4-5.0) 


  


 


 


Albumin/Globulin Ratio  0.8 (1.0-1.7)   











Images


Images


IMPRESSION: New left lower lobe opacity suggesting a neoplasm versus focal


pneumonitis. CT scanning is suggested for further evaluation.








SANDIE HILLIARD MD Feb 21, 2017 10:52

## 2017-02-22 VITALS — DIASTOLIC BLOOD PRESSURE: 70 MMHG | SYSTOLIC BLOOD PRESSURE: 150 MMHG

## 2017-02-22 VITALS — SYSTOLIC BLOOD PRESSURE: 131 MMHG | DIASTOLIC BLOOD PRESSURE: 81 MMHG

## 2017-02-22 VITALS — DIASTOLIC BLOOD PRESSURE: 73 MMHG | SYSTOLIC BLOOD PRESSURE: 108 MMHG

## 2017-02-22 VITALS — DIASTOLIC BLOOD PRESSURE: 91 MMHG | SYSTOLIC BLOOD PRESSURE: 145 MMHG

## 2017-02-22 VITALS — SYSTOLIC BLOOD PRESSURE: 121 MMHG | DIASTOLIC BLOOD PRESSURE: 78 MMHG

## 2017-02-22 VITALS — DIASTOLIC BLOOD PRESSURE: 70 MMHG | SYSTOLIC BLOOD PRESSURE: 112 MMHG

## 2017-02-22 LAB
ALBUMIN SERPL-MCNC: 2.6 G/DL (ref 3.4–5)
ANION GAP SERPL CALC-SCNC: 11 MMOL/L (ref 6–14)
BUN SERPL-MCNC: 29 MG/DL (ref 8–26)
CALCIUM SERPL-MCNC: 9.1 MG/DL (ref 8.5–10.1)
CHLORIDE SERPL-SCNC: 106 MMOL/L (ref 98–107)
CO2 SERPL-SCNC: 20 MMOL/L (ref 21–32)
CREAT SERPL-MCNC: 1.7 MG/DL (ref 0.7–1.3)
GFR SERPLBLD BASED ON 1.73 SQ M-ARVRAT: 47.3 ML/MIN
GLUCOSE SERPL-MCNC: 279 MG/DL (ref 70–99)
INR PPP: 1.1 (ref 0.8–1.1)
PHOSPHATE SERPL-MCNC: 2.5 MG/DL (ref 2.6–4.7)
POTASSIUM SERPL-SCNC: 4.4 MMOL/L (ref 3.5–5.1)
PROTHROMBIN TIME: 13.4 SEC (ref 11.7–14)
SODIUM SERPL-SCNC: 137 MMOL/L (ref 136–145)

## 2017-02-22 RX ADMIN — TAMSULOSIN HYDROCHLORIDE SCH MG: 0.4 CAPSULE ORAL at 20:44

## 2017-02-22 RX ADMIN — INSULIN DETEMIR SCH UNITS: 100 INJECTION, SOLUTION SUBCUTANEOUS at 12:30

## 2017-02-22 RX ADMIN — GLYBURIDE SCH MG: 5 TABLET ORAL at 18:34

## 2017-02-22 RX ADMIN — INSULIN ASPART SCH UNITS: 100 INJECTION, SOLUTION INTRAVENOUS; SUBCUTANEOUS at 12:00

## 2017-02-22 RX ADMIN — POTASSIUM CHLORIDE SCH MEQ: 1500 TABLET, EXTENDED RELEASE ORAL at 09:26

## 2017-02-22 RX ADMIN — GLYBURIDE SCH MG: 5 TABLET ORAL at 09:25

## 2017-02-22 RX ADMIN — INSULIN ASPART SCH UNITS: 100 INJECTION, SOLUTION INTRAVENOUS; SUBCUTANEOUS at 18:39

## 2017-02-22 RX ADMIN — INSULIN ASPART SCH UNITS: 100 INJECTION, SOLUTION INTRAVENOUS; SUBCUTANEOUS at 09:30

## 2017-02-22 RX ADMIN — INSULIN ASPART SCH UNITS: 100 INJECTION, SOLUTION INTRAVENOUS; SUBCUTANEOUS at 11:35

## 2017-02-22 RX ADMIN — INSULIN ASPART SCH UNITS: 100 INJECTION, SOLUTION INTRAVENOUS; SUBCUTANEOUS at 18:38

## 2017-02-22 NOTE — PDOC
Provider Note


Provider Note


dictated


LLL mass, Bronch noon 2/23








CARLITOS CHURCH MD Feb 22, 2017 15:01

## 2017-02-22 NOTE — PDOC
SUBJECTIVE


ROS


MAYANK





Diogn better today, Vision is improved, was feeling strong enough to walk around





CVS:   no Orthopnea, no CP


RESP:   no SOB, no MACE


GI:   no Nausea, no Vomiting


:   no Dysuria, no Urgency





OBJECTIVE


Vital Signs





 Vital Signs








  Date Time  Temp Pulse Resp B/P Pulse Ox O2 Delivery O2 Flow Rate FiO2


 


2/22/17 11:39 98.1 95 16 108/73 97 Room Air  





 98.1       








I & 0











 Intake and Output 


 


 2/22/17





 07:00


 


Intake Total 3545 ml


 


Output Total 1050 ml


 


Balance 2495 ml


 


 


 


Intake Oral 720 ml


 


IV Total 2825 ml


 


Output Urine Total 1050 ml


 


# Bowel Movements 1











PHYSICAL EXAM


Physical Exam


General Appearance:       Awake       Alert Oriented x  3   In  no  Distress


Eyes:       VIsion Unchanged Conjunctiva Normal


EN:       No EN Drainage  Mucous Memb.    moist


Neck:         no  JVD   no  JVP  Supple   no  Thyromegaly


CVS:       S1 S2   no  Murmur  No Gallop  No Rub   no Edema


Resp:        no  Rales   no  Rhonchi   no  Acc. Muscle use


GI:       BS +ve    NO Bruit   Non Tender   Non Distended


:        no  CVA tenderness;    no  Suprapubic Tenderness














Assessment & Plan





MAYANK - Presumed VMN due to dehydration from ^ed FSBS.    Current FLuid and E-

lyte status does not necessitate emergent need for Dialysis.  Will re-evaluate 

for Dialysis in am





CKD III -  This may be his new baseline. ? Ischemic Nephropathy with smallish 

Rt Kidney





LINDA as ntoed on Renal US - Start Flomax - URO eval as OP if Lung Cancer 

prognosis is accepatable





Hypotension - suspect due to dehydration - now better





hyponatremia - Pseudohyponatremia due to ^ FSBS; resolved





Mild Met acidosis (NAG) - D/c IVF





Dehydration - much better wtih IVF already





Lwo Phos - IV Na Hpos





lowish Mag - 1 gm today





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





Discussed Plan of Care and prognosis etc. at length with pt





COMMENT/RELEVANT DATA


Meds





 Current Medications








 Medications


  (Trade)  Dose


 Ordered  Sig/Krystal  Start Time


 Stop Time Status Last Admin


Dose Admin


 


 Acetaminophen


  (Tylenol)  650 mg  PRN Q4HRS  PRN  2/20/17 17:15


 2/21/17 17:14 DC  


 


 


 Acetaminophen/


 Hydrocodone Bitart


  (Lortab 5/325)  1 tab  PRN Q4HRS  PRN  2/21/17 09:45


     


 


 


 Celecoxib


  (Celebrex)  200 mg  PRN BID  PRN  2/21/17 09:45


     


 


 


 Cyclobenzaprine


 HCl


  (Flexeril)  10 mg  PRN TID  PRN  2/21/17 09:45


     


 


 


 Dextrose  12.5 gm  PRN Q15MIN  PRN  2/20/17 21:45


     


 


 


 Enoxaparin Sodium


  (Lovenox 30mg


 Syringe)  30 mg  QHS  2/20/17 23:00


    2/21/17 21:58


30 MG


 


 Enoxaparin Sodium


  (Lovenox Per


 Pharmacy


 Prophylaxis


 Dosing)  1 each  PRN DAILY  PRN  2/20/17 22:00


     


 


 


 Glyburide


  (Diabeta)  5 mg  BIDWMEALS  2/21/17 17:00


    2/22/17 09:25


5 MG


 


 Hydrochlorothiazide


  (Microzide)  12.5 mg  DAILY  2/22/17 09:00


 2/22/17 09:00 DC  


 


 


 Influenza Virus


 Vaccine Quadrival


  (Fluarix Quad


 [id number] Syringe)  0.5 ml  ONCE ONCE  2/21/17 09:00


 2/21/17 09:04 DC 2/21/17 09:32


0.5 ML


 


 Info


  (Do NOT chart on


 this placeholder)  0.5 each  1X  ONCE  2/21/17 02:45


 2/21/17 02:46 UNV  


 


 


 Insulin Aspart


  (Novolog)  8 units  TIDAC  2/22/17 12:00


     


 


 


 Insulin Detemir


  (Levemir)  25 units  QHS  2/21/17 21:00


 2/21/17 21:00 DC  


 


 


 Insulin Detemir


 15 units  15 units  [id number]  2/22/17 12:30


     


 


 


 Losartan Potassium


  (Cozaar)  50 mg  DAILY  2/22/17 09:00


 2/22/17 09:00 DC  


 


 


 Magnesium Sulfate/


 Dextrose


  (Magnesium


 Sulfate PREMIX


 2GM)  50 ml @ 25


 mls/hr  1X  ONCE  2/22/17 12:30


 2/22/17 14:29   


 


 


 Ondansetron HCl 4


 mg  4 mg  PRN Q8HRS  PRN  2/20/17 17:15


 2/21/17 17:14 DC  


 


 


 Pneumococcal


 Polyvalent Vaccine


  (Do NOT chart on


 this placeholder)  0.5 each  1X  ONCE  2/21/17 02:45


 2/21/17 02:46 UNV  


 


 


 Pneumococcal


 Polyvalent Vaccine


  (Pneumovax 23)  0.5 ml  ONCE ONCE  2/21/17 09:00


 2/21/17 09:04 DC 2/21/17 09:31


0.5 ML


 


 Potassium Chloride


  (Klor-Con)  20 meq  [id number]  2/21/17 08:00


    2/22/17 09:26


20 MEQ


 


 Sodium Chloride


  (Iv Sodium


 Chloride 0.9%


 500ml Bag)  500 ml @ 


 500 mls/hr  1X  ONCE  2/20/17 15:30


 2/20/17 16:29 DC 2/20/17 15:30


500 MLS/HR


 


 Sodium Chloride


  (Iv Sodium


 Chloride 0.9%


 1000ml Bag)  1,000 ml @ 


 125 mls/hr  Q8H  2/20/17 17:04


 2/21/17 17:03 DC 2/21/17 12:26


125 MLS/HR








Lab





Laboratory Tests








Test


  2/21/17


12:30 2/21/17


16:59 2/21/17


20:51 2/22/17


06:55


 


Urine Collection Type Unknown    


 


Urine Color Yellow    


 


Urine Clarity Clear    


 


Urine pH 5.5    


 


Urine Specific Gravity 1.025    


 


Urine Protein


  15.7mg/dL (Not


Estab.) 


  


  


 


 


Urine Glucose (UA)


  >=1000mg/dL


(NEG) 


  


  


 


 


Urine Ketones (Stick)


  Tracemg/dL


(NEG) 


  


  


 


 


Urine Blood Small (NEG)    


 


Urine Nitrite Negative (NEG)    


 


Urine Bilirubin Negative (NEG)    


 


Urine Urobilinogen Dipstick


  0.2mg/dL (0.2


mg/dL) 


  


  


 


 


Urine Leukocyte Esterase Moderate (NEG)    


 


Urine RBC Occ/HPF (0-2)    


 


Urine WBC


  11-20/HPF


(0-4) 


  


  


 


 


Urine Squamous Epithelial


Cells Few/LPF 


  


  


  


 


 


Urine Bacteria


  Few/HPF


(0-FEW) 


  


  


 


 


Urine Mucus Slight/LPF    


 


Urine Random Sodium


  98mmol/L (Not


Estab.) 


  


  


 


 


Urine Creatinine


  65.7mg/dL (Not


Estab.) 


  


  


 


 


Urine Protein/Creatinine Ratio


  239mg/g creat


(0-200) 


  


  


 


 


Glucose (Fingerstick)


  


  298mg/dL


(70-99) 285mg/dL


(70-99) 


 


 


Hemoglobin


  


  


  


  11.9g/dL


(13.0-17.5)


 


Sodium Level


  


  


  


  137mmol/L


(136-145)


 


Potassium Level


  


  


  


  4.4mmol/L


(3.5-5.1)


 


Chloride Level


  


  


  


  106mmol/L


()


 


Carbon Dioxide Level


  


  


  


  20mmol/L


(21-32)


 


Anion Gap    11 (6-14) 


 


Blood Urea Nitrogen    29mg/dL (8-26) 


 


Creatinine


  


  


  


  1.7mg/dL


(0.7-1.3)


 


Estimated GFR


(Cockcroft-Gault) 


  


  


  47.3 


 


 


Glucose Level


  


  


  


  279mg/dL


(70-99)


 


Calcium Level


  


  


  


  9.1mg/dL


(8.5-10.1)


 


Phosphorus Level


  


  


  


  2.5mg/dL


(2.6-4.7)


 


Magnesium Level


  


  


  


  1.7mg/dL


(1.8-2.4)


 


Albumin


  


  


  


  2.6g/dL


(3.4-5.0)














Test


  2/22/17


07:28 2/22/17


10:59 


  


 


 


Glucose (Fingerstick)


  254mg/dL


(70-99) 369mg/dL


(70-99) 


  


 








Other


The right kidney measures at least 7.9 cm. This may be an underestimate.


Cortical thickness are not clearly decreased. The sonographer suggests


cortical hyperechogenicity on real-time scanning but this is not clearly seen


on these images. There is no hydronephrosis.     





The left kidney measures at least 8.8 cm. Cortical thickness is preserved. The


sonographer suggests increased renal cortical echogenicity but this is not


clearly seen on these images. There is no hydronephrosis. A benign cyst in the


interpolar region measures 1.6 x 1.7 cm.





The prostate is enlarged measuring 4.8 x 3.8 cm. It impresses mildly on the


bladder base. There is diffuse bladder wall thickening.





IMPRESSION:


1. The kidneys measure small consistent with atrophy. No hydronephrosis.


2. Benign 1.7 left renal cyst.


3. Prostatic hypertrophy. Diffuse bladder wall thickening indicates chronic


outlet obstruction or inflammation.








CT Chest:





1. Left lower lobe mass involving the left hilum most likely represents a


primary lung malignancy. An inflammatory process is much less likely.


Bronchoscopic evaluation is suggested.


2. No mediastinal adenopathy is evident.


3. Mild coronary artery calcifications.


4. Hepatic cysts








SANDIE HILLIARD MD Feb 22, 2017 12:31

## 2017-02-22 NOTE — CONS
DATE OF CONSULTATION:  



ATTENDING PHYSICIAN:  Dr. Larose.



REASON FOR CONSULTATION:  Lung mass, weight loss, abnormal CT chest.



HISTORY OF PRESENT ILLNESS:  The patient is a 81-year-old male, who has been a

smoker for 30 years before, quitting 10 years ago.  He presented to the hospital

after he has difficulty in swallowing food.  The patient states he has lost

about 20 pounds in the last 2 weeks.  He has some mild exertional dyspnea.  He

states there is no cough, no hemoptysis.  No headaches.  No nausea, vomiting or

diarrhea.  I have reviewed the patient's CT chest, which was abnormal.  The

patient has a 4.4 cm mass in the left lower lobe.  The margins are irregular and

spiculated in suspicious for a primary lung malignancy.  There are mild

infiltrates distal to the mass.  There is no significant pathological

mediastinal adenopathy.  There were some hepatic cysts.  Consultation requested

for further evaluation and management.



PAST MEDICAL HISTORY:  Significant for suspected COPD, unknown FEV1.



PAST SURGICAL HISTORY:  No recent surgery.



ALLERGIES:  None.



REVIEW OF SYSTEMS:  Twelve-point systems obtained.  Pertinent positives

discussed in my history of present illness, otherwise noncontributory.  All

systems that were negative were reviewed as well.



SOCIAL HISTORY:  Smoked for 30 years less than 1 pack per day.  He has worked in

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



MEDICATIONS:  Reviewed, as this is in the MRAD.



PHYSICAL EXAMINATION:

VITAL SIGNS:  Blood pressure 108/73, afebrile, pulse ox 97% on room air.

HEENT:  Sclerae nonicteric.

NECK:  Supple.

LUNGS:  Diminished breath sounds bilaterally.  No crackles or wheezes.

CARDIOVASCULAR:  Regular rate and rhythm.

ABDOMEN:  Soft, nontender.

EXTREMITIES:  With no pitting edema.



LABORATORY DATA:  Reviewed.  Influenza was negative.  Chemistry shows a BUN of

29, creatinine 1.7.  His blood sugars on admission was 775 and they are getting

better for his BUN and creatinine was 63 and 2.51 on admission, which is

improving as well.  His white cell count is 4.9, hemoglobin is 11.9 and

platelets are 145.



IMPRESSION:

1.  A 4.4 cm left lower lobe mass in the patient, who is an ex-smoker.  He

smoked for 30 years.  He has lost about 20 pounds in the last 2 weeks.  The CT

chest findings are highly concerning for a bronchogenic cancer.  He would

benefit from diagnostic bronchoscopy.

2.  Suspected underlying chronic obstructive pulmonary disease.

3.  Hyperglycemia, present on admission, currently improved.

4.  Acute renal failure present on admission, which is improved with hydration.

5.  Moderate protein-calorie malnutrition.



RECOMMENDATIONS:

1.  Discuss with the patient regarding the option of diagnostic bronchoscopy

with biopsies.  All risk and benefits were explained and he agreed to proceed

with the procedure.

2.  If there is no endobronchial lesion on the bronchoscopy, then will proceed 
with

a CT-guided needle aspiration biopsy.

3.  We will also obtain full PFTs.

4.  Hold anticoagulation.

5.  Continue with present bronchodilators.

6.  Discussed with Dr. Larose.  We will make further recommendations after review

of the bronchoscopy.

 



______________________________

CARLITOS CHURCH MD



DR:  [id number]  JOB#:  [id number] / [id number]

DD:  02/22/2017 15:00  DT:  02/22/2017 15:51

ÁNGELA

## 2017-02-22 NOTE — PDOC
PROGRESS NOTES


Chief Complaint


Chief Complaint


HONK


DM new dx


acute renal failure, vasomotor nephropathy


Dysphagia, globus sensation, POA, resolved





new spiculated pulmonary nodule





History of Present Illness


History of Present Illness


BS 70s yesterday, changed from insulin to secretegogue.


now blood sugars 300 range


restart insulin,   long and short acting and SSI





  lung nodule, pt WAS a heavy smoker, quit 25 yrs ago





NO more dysphagia, ready for solids





PLAn:


Advance to DM diet


DM education dc


levemir and novolog scheduled - BS on low side now and he is INSULIN naive


HE can start with some sulfonylureas, no metformin given MAYANK


CT scan to further delineate new lung nodule


Dw pt and RN


Keep SSI





Vitals


Vitals





 Vital Signs








  Date Time  Temp Pulse Resp B/P Pulse Ox O2 Delivery O2 Flow Rate FiO2


 


2/22/17 11:39 98.1 95 16 108/73 97 Room Air  





 98.1       











Physical Exam


General:  Alert, Oriented X3, Cooperative, No acute distress


Abdomen:  Normal bowel sounds, Soft, No tenderness, No hepatosplenomegaly


Extremities:  No clubbing, No cyanosis


Skin:  No rashes, Other (dry skin,  poor turgor,   )





Labs


LABS





Laboratory Tests








Test


  2/21/17


16:59 2/21/17


20:51 2/22/17


06:55 2/22/17


07:28


 


Glucose (Fingerstick)


  298mg/dL


(70-99) 285mg/dL


(70-99) 


  254mg/dL


(70-99)


 


Hemoglobin


  


  


  11.9g/dL


(13.0-17.5) 


 


 


Sodium Level


  


  


  137mmol/L


(136-145) 


 


 


Potassium Level


  


  


  4.4mmol/L


(3.5-5.1) 


 


 


Chloride Level


  


  


  106mmol/L


() 


 


 


Carbon Dioxide Level


  


  


  20mmol/L


(21-32) 


 


 


Anion Gap   11 (6-14)  


 


Blood Urea Nitrogen   29mg/dL (8-26)  


 


Creatinine


  


  


  1.7mg/dL


(0.7-1.3) 


 


 


Estimated GFR


(Cockcroft-Gault) 


  


  47.3 


  


 


 


Glucose Level


  


  


  279mg/dL


(70-99) 


 


 


Calcium Level


  


  


  9.1mg/dL


(8.5-10.1) 


 


 


Phosphorus Level


  


  


  2.5mg/dL


(2.6-4.7) 


 


 


Magnesium Level


  


  


  1.7mg/dL


(1.8-2.4) 


 


 


Albumin


  


  


  2.6g/dL


(3.4-5.0) 


 














Test


  2/22/17


10:59 


  


  


 


 


Glucose (Fingerstick)


  369mg/dL


(70-99) 


  


  


 











Review of Systems


Review of Systems


eating well


 no new complaint





Assessment and Plan


Assessmemt and Plan


consult onc and pulm for lung mass,    


 Problems


Medical Problems:


(1) ARF (acute renal failure)


Status: Acute  





(2) Dysphagia


Status: Acute  





(3) Dysphagia


Status: Acute  





(4) Hyperglycemia


Status: Acute  





Problems:  





Comment


Review of Relevant


I have reviewed the following items emelia (where applicable) has been applied.


Labs





Laboratory Tests








Test


  2/20/17


14:00 2/20/17


15:12 2/20/17


22:14 2/20/17


23:35


 


Influenza Type A Antigen


  Negative


(NEGATIVE) 


  


  


 


 


Influenza Type B Antigen


  Negative


(NEGATIVE) 


  


  


 


 


White Blood Count


  


  4.6x10^3/uL


(4.0-11.0) 


  


 


 


Red Blood Count


  


  5.17x10^6/uL


(4.30-5.70) 


  


 


 


Hemoglobin


  


  14.2g/dL


(13.0-17.5) 


  


 


 


Hematocrit


  


  44.5%


(39.0-53.0) 


  


 


 


Mean Corpuscular Volume  86fL ()   


 


Mean Corpuscular Hemoglobin  27pg (25-35)   


 


Mean Corpuscular Hemoglobin


Concent 


  32g/dL (31-37) 


  


  


 


 


Red Cell Distribution Width


  


  14.0%


(11.5-14.5) 


  


 


 


Platelet Count


  


  156x10^3/uL


(140-400) 


  


 


 


Neutrophils (%) (Auto)  65% (31-73)   


 


Lymphocytes (%) (Auto)  27% (24-48)   


 


Monocytes (%) (Auto)  6% (0-9)   


 


Eosinophils (%) (Auto)  2% (0-3)   


 


Basophils (%) (Auto)  1% (0-3)   


 


Neutrophils # (Auto)


  


  3.0x10^3uL


(1.8-7.7) 


  


 


 


Lymphocytes # (Auto)


  


  1.3x10^3/uL


(1.0-4.8) 


  


 


 


Monocytes # (Auto)


  


  0.3x10^3/uL


(0.0-1.1) 


  


 


 


Eosinophils # (Auto)


  


  0.1x10^3/uL


(0.0-0.7) 


  


 


 


Basophils # (Auto)


  


  0.0x10^3/uL


(0.0-0.2) 


  


 


 


Sodium Level


  


  133mmol/L


(136-145) 133mmol/L


(136-145) 135mmol/L


(136-145)


 


Potassium Level


  


  5.2mmol/L


(3.5-5.1) 5.3mmol/L


(3.5-5.1) 5.0mmol/L


(3.5-5.1)


 


Chloride Level


  


  95mmol/L


() 98mmol/L


() 100mmol/L


()


 


Carbon Dioxide Level


  


  24mmol/L


(21-32) 20mmol/L


(21-32) 22mmol/L


(21-32)


 


Anion Gap  14 (6-14)  15 (6-14)  13 (6-14) 


 


Blood Urea Nitrogen  62mg/dL (8-26)  63mg/dL (8-26)  61mg/dL (8-26) 


 


Creatinine


  


  2.8mg/dL


(0.7-1.3) 2.5mg/dL


(0.7-1.3) 2.5mg/dL


(0.7-1.3)


 


Estimated GFR


(Cockcroft-Gault) 


  26.6 


  30.3 


  30.3 


 


 


Glucose Level


  


  628mg/dL


(70-99) 775mg/dL


(70-99) 666mg/dL


(70-99)


 


Hemoglobin A1c


  


  15.1%


(4.8-5.6) 


  


 


 


Calcium Level


  


  10.2mg/dL


(8.5-10.1) 9.5mg/dL


(8.5-10.1) 9.6mg/dL


(8.5-10.1)














Test


  2/21/17


00:41 2/21/17


04:30 2/21/17


07:57 2/21/17


11:46


 


Glucose (Fingerstick)


  507mg/dL


(70-99) 


  78mg/dL


(70-99) 323mg/dL


(70-99)


 


White Blood Count


  


  4.9x10^3/uL


(4.0-11.0) 


  


 


 


Red Blood Count


  


  4.83x10^6/uL


(4.30-5.70) 


  


 


 


Hemoglobin


  


  13.2g/dL


(13.0-17.5) 


  


 


 


Hematocrit


  


  40.6%


(39.0-53.0) 


  


 


 


Mean Corpuscular Volume  84fL ()   


 


Mean Corpuscular Hemoglobin  27pg (25-35)   


 


Mean Corpuscular Hemoglobin


Concent 


  33g/dL (31-37) 


  


  


 


 


Red Cell Distribution Width


  


  13.9%


(11.5-14.5) 


  


 


 


Platelet Count


  


  145x10^3/uL


(140-400) 


  


 


 


Neutrophils (%) (Auto)  52% (31-73)   


 


Lymphocytes (%) (Auto)  37% (24-48)   


 


Monocytes (%) (Auto)  8% (0-9)   


 


Eosinophils (%) (Auto)  3% (0-3)   


 


Basophils (%) (Auto)  0% (0-3)   


 


Neutrophils # (Auto)


  


  2.6x10^3uL


(1.8-7.7) 


  


 


 


Lymphocytes # (Auto)


  


  1.8x10^3/uL


(1.0-4.8) 


  


 


 


Monocytes # (Auto)


  


  0.4x10^3/uL


(0.0-1.1) 


  


 


 


Eosinophils # (Auto)


  


  0.2x10^3/uL


(0.0-0.7) 


  


 


 


Basophils # (Auto)


  


  0.0x10^3/uL


(0.0-0.2) 


  


 


 


Sodium Level


  


  144mmol/L


(136-145) 


  


 


 


Potassium Level


  


  3.7mmol/L


(3.5-5.1) 


  


 


 


Chloride Level


  


  107mmol/L


() 


  


 


 


Carbon Dioxide Level


  


  25mmol/L


(21-32) 


  


 


 


Anion Gap  12 (6-14)   


 


Blood Urea Nitrogen  54mg/dL (8-26)   


 


Creatinine


  


  2.1mg/dL


(0.7-1.3) 


  


 


 


Estimated GFR


(Cockcroft-Gault) 


  37.0 


  


  


 


 


BUN/Creatinine Ratio  26 (6-20)   


 


Glucose Level


  


  98mg/dL


(70-99) 


  


 


 


Uric Acid


  


  10.2mg/dL


(3.5-7.2) 


  


 


 


Calcium Level


  


  9.9mg/dL


(8.5-10.1) 


  


 


 


Ionized Calcium


  


  1.32mmol/L


(1.13-1.32) 


  


 


 


Phosphorus Level


  


  2.7mg/dL


(2.6-4.7) 


  


 


 


Magnesium Level


  


  2.1mg/dL


(1.8-2.4) 


  


 


 


Total Bilirubin


  


  0.3mg/dL


(0.2-1.0) 


  


 


 


Aspartate Amino Transf


(AST/SGOT) 


  37U/L (15-37) 


  


  


 


 


Alanine Aminotransferase


(ALT/SGPT) 


  63U/L (16-63) 


  


  


 


 


Alkaline Phosphatase


  


  129U/L


() 


  


 


 


Creatine Kinase


  


  153U/L


() 


  


 


 


Total Protein


  


  7.0g/dL


(6.4-8.2) 


  


 


 


Albumin


  


  3.1g/dL


(3.4-5.0) 


  


 


 


Albumin/Globulin Ratio  0.8 (1.0-1.7)   














Test


  2/21/17


12:30 2/21/17


16:59 2/21/17


20:51 2/22/17


06:55


 


Urine Collection Type Unknown    


 


Urine Color Yellow    


 


Urine Clarity Clear    


 


Urine pH 5.5    


 


Urine Specific Gravity 1.025    


 


Urine Protein


  15.7mg/dL (Not


Estab.) 


  


  


 


 


Urine Glucose (UA)


  >=1000mg/dL


(NEG) 


  


  


 


 


Urine Ketones (Stick)


  Tracemg/dL


(NEG) 


  


  


 


 


Urine Blood Small (NEG)    


 


Urine Nitrite Negative (NEG)    


 


Urine Bilirubin Negative (NEG)    


 


Urine Urobilinogen Dipstick


  0.2mg/dL (0.2


mg/dL) 


  


  


 


 


Urine Leukocyte Esterase Moderate (NEG)    


 


Urine RBC Occ/HPF (0-2)    


 


Urine WBC


  11-20/HPF


(0-4) 


  


  


 


 


Urine Squamous Epithelial


Cells Few/LPF 


  


  


  


 


 


Urine Bacteria


  Few/HPF


(0-FEW) 


  


  


 


 


Urine Mucus Slight/LPF    


 


Urine Random Sodium


  98mmol/L (Not


Estab.) 


  


  


 


 


Urine Creatinine


  65.7mg/dL (Not


Estab.) 


  


  


 


 


Urine Protein/Creatinine Ratio


  239mg/g creat


(0-200) 


  


  


 


 


Glucose (Fingerstick)


  


  298mg/dL


(70-99) 285mg/dL


(70-99) 


 


 


Hemoglobin


  


  


  


  11.9g/dL


(13.0-17.5)


 


Sodium Level


  


  


  


  137mmol/L


(136-145)


 


Potassium Level


  


  


  


  4.4mmol/L


(3.5-5.1)


 


Chloride Level


  


  


  


  106mmol/L


()


 


Carbon Dioxide Level


  


  


  


  20mmol/L


(21-32)


 


Anion Gap    11 (6-14) 


 


Blood Urea Nitrogen    29mg/dL (8-26) 


 


Creatinine


  


  


  


  1.7mg/dL


(0.7-1.3)


 


Estimated GFR


(Cockcroft-Gault) 


  


  


  47.3 


 


 


Glucose Level


  


  


  


  279mg/dL


(70-99)


 


Calcium Level


  


  


  


  9.1mg/dL


(8.5-10.1)


 


Phosphorus Level


  


  


  


  2.5mg/dL


(2.6-4.7)


 


Magnesium Level


  


  


  


  1.7mg/dL


(1.8-2.4)


 


Albumin


  


  


  


  2.6g/dL


(3.4-5.0)














Test


  2/22/17


07:28 2/22/17


10:59 


  


 


 


Glucose (Fingerstick)


  254mg/dL


(70-99) 369mg/dL


(70-99) 


  


 








Laboratory Tests








Test


  2/21/17


16:59 2/21/17


20:51 2/22/17


06:55 2/22/17


07:28


 


Glucose (Fingerstick)


  298mg/dL


(70-99) 285mg/dL


(70-99) 


  254mg/dL


(70-99)


 


Hemoglobin


  


  


  11.9g/dL


(13.0-17.5) 


 


 


Sodium Level


  


  


  137mmol/L


(136-145) 


 


 


Potassium Level


  


  


  4.4mmol/L


(3.5-5.1) 


 


 


Chloride Level


  


  


  106mmol/L


() 


 


 


Carbon Dioxide Level


  


  


  20mmol/L


(21-32) 


 


 


Anion Gap   11 (6-14)  


 


Blood Urea Nitrogen   29mg/dL (8-26)  


 


Creatinine


  


  


  1.7mg/dL


(0.7-1.3) 


 


 


Estimated GFR


(Cockcroft-Gault) 


  


  47.3 


  


 


 


Glucose Level


  


  


  279mg/dL


(70-99) 


 


 


Calcium Level


  


  


  9.1mg/dL


(8.5-10.1) 


 


 


Phosphorus Level


  


  


  2.5mg/dL


(2.6-4.7) 


 


 


Magnesium Level


  


  


  1.7mg/dL


(1.8-2.4) 


 


 


Albumin


  


  


  2.6g/dL


(3.4-5.0) 


 














Test


  2/22/17


10:59 


  


  


 


 


Glucose (Fingerstick)


  369mg/dL


(70-99) 


  


  


 








Medications





 Current Medications


Sodium Chloride 500 ml @  500 mls/hr 1X  ONCE IV  Last administered on 2/20/ 17at 15:30;  Start 2/20/17 at 15:30;  Stop 2/20/17 at 16:29;  Status DC


Sodium Chloride (Iv Sodium Chloride 0.9% 1000ml Bag) 1,000 ml @  510 mls/hr 

Q1H58M IV ;  Start 2/20/17 at 17:15;  Stop 2/20/17 at 21:15;  Status DC


Ondansetron HCl 4 mg 4 mg PRN Q8HRS  PRN IV NAUSEA/VOMITING;  Start 2/20/17 at 

17:15;  Stop 2/21/17 at 17:14;  Status DC


Sodium Chloride (Iv Sodium Chloride 0.9% 1000ml Bag) 1,000 ml @  125 mls/hr Q8H 

IV  Last administered on 2/21/17at 12:26;  Start 2/20/17 at 17:04;  Stop 2/21/ 17 at 17:03;  Status DC


Acetaminophen (Tylenol) 650 mg PRN Q4HRS  PRN PO FEVER;  Start 2/20/17 at 17:15

;  Stop 2/21/17 at 17:14;  Status DC


Insulin Aspart (Novolog) 0-9 UNITS TIDWMEALS SQ  Last administered on 2/22/17at 

11:35;  Start 2/21/17 at 08:00


Dextrose 12.5 gm PRN Q15MIN  PRN IV SEE COMMENTS;  Start 2/20/17 at 21:45


Insulin Aspart (Novolog) 5 units TIDAC SQ ;  Start 2/21/17 at 07:30;  Stop 2/21/ 17 at 07:30;  Status DC


Insulin Detemir (Levemir) 10 units QHS SQ  Last administered on 2/20/17at 22:37

;  Start 2/20/17 at 22:00;  Stop 2/21/17 at 00:57;  Status DC


Insulin Aspart (Novolog) 10 units 1X  ONCE SQ  Last administered on 2/20/17at 22

:36;  Start 2/20/17 at 22:30;  Stop 2/20/17 at 22:31;  Status DC


Potassium Chloride (Klor-Con) 20 meq DAILYWBKFT PO  Last administered on 2/22/ 17at 09:26;  Start 2/21/17 at 08:00;  Stop 2/22/17 at 12:34;  Status DC


Enoxaparin Sodium (Lovenox Per Pharmacy Prophylaxis Dosing) 1 each PRN DAILY  

PRN MC SEE COMMENTS;  Start 2/20/17 at 22:00


Enoxaparin Sodium (Lovenox 30mg Syringe) 30 mg QHS SQ  Last administered on 2/21 /17at 21:58;  Start 2/20/17 at 23:00


Insulin Aspart (Novolog) 15 units 1X  ONCE SQ  Last administered on 2/21/17at 00

:11;  Start 2/21/17 at 00:30;  Stop 2/21/17 at 00:31;  Status DC


Insulin Aspart (Novolog) 15 units TIDAC SQ ;  Start 2/21/17 at 07:30;  Stop 2/21 /17 at 09:36;  Status DC


Insulin Detemir (Levemir) 25 units QHS SQ ;  Start 2/21/17 at 21:00;  Stop 2/21/ 17 at 21:00;  Status DC


Insulin Aspart (Novolog) 17 units 1X  ONCE SQ  Last administered on 2/21/17at 01

:28;  Start 2/21/17 at 01:15;  Stop 2/21/17 at 01:16;  Status DC


Info (Do NOT chart on this placeholder) 0.5 each 1X  ONCE MC ;  Start 2/21/17 

at 02:45;  Stop 2/21/17 at 02:46;  Status UNV


Pneumococcal Polyvalent Vaccine (Do NOT chart on this placeholder) 0.5 each 1X  

ONCE MC ;  Start 2/21/17 at 02:45;  Stop 2/21/17 at 02:46;  Status UNV


Influenza Virus Vaccine Quadrival (Fluarix Quad [id number] Syringe) 0.5 ml ONCE 

ONCE VAX IM  Last administered on 2/21/17at 09:32;  Start 2/21/17 at 09:00;  

Stop 2/21/17 at 09:04;  Status DC


Pneumococcal Polyvalent Vaccine (Pneumovax 23) 0.5 ml ONCE ONCE VAX IM  Last 

administered on 2/21/17at 09:31;  Start 2/21/17 at 09:00;  Stop 2/21/17 at 09:04

;  Status DC


Celecoxib (Celebrex) 200 mg PRN BID  PRN PO PAIN;  Start 2/21/17 at 09:45;  

Stop 2/22/17 at 12:34;  Status DC


Cyclobenzaprine HCl (Flexeril) 10 mg PRN TID  PRN PO SPASMS;  Start 2/21/17 at 

09:45


Acetaminophen/ Hydrocodone Bitart (Lortab 5/325) 1 tab PRN Q4HRS  PRN PO 

MODERATE PAIN;  Start 2/21/17 at 09:45


Losartan Potassium (Cozaar) 50 mg DAILY PO ;  Start 2/22/17 at 09:00;  Stop 2/22 /17 at 09:00;  Status DC


Hydrochlorothiazide 12.5 mg 12.5 mg DAILY PO ;  Start 2/22/17 at 09:00;  Stop 2/ 22/17 at 09:00;  Status DC


Magnesium Sulfate/ Dextrose (Magnesium Sulfate PREMIX 2GM) 50 ml @ 25 mls/hr 

PRN DAILY  PRN IV for Mag < 1.7 on am labs;  Start 2/21/17 at 11:00


Glyburide (Diabeta) 5 mg BIDWMEALS PO  Last administered on 2/22/17at 09:25;  

Start 2/21/17 at 17:00


Insulin Detemir 15 units 15 units DAILY08 SQ ;  Start 2/22/17 at 12:30


Magnesium Sulfate/ Dextrose (Magnesium Sulfate PREMIX 2GM) 50 ml @ 25 mls/hr 1X

  ONCE IV ;  Start 2/22/17 at 12:30;  Stop 2/22/17 at 14:29


Insulin Aspart 8 units 8 units TIDAC SQ ;  Start 2/22/17 at 12:00


Magnesium Sulfate/ Dextrose 100 ml @  17 mls/hr 1X  ONCE IV ;  Start 2/22/17 at 

13:00;  Stop 2/22/17 at 18:52


Sodium Phosphate/ Dextrose 263.3333 ml @  64.167 m... 1X  ONCE IV ;  Start 2/22/ 17 at 13:00;  Stop 2/22/17 at 17:06


Tamsulosin HCl (Flomax) 0.4 mg QHS PO ;  Start 2/22/17 at 21:00





Active Scripts


Active


Reported


Cyclobenzaprine Hcl 10 Mg Tablet   


Omeprazole 20 Mg Capsule.   


Celecoxib 200 Mg Capsule   


Hydrocodone-Apap 5-300 (Hydrocodone Bit/Acetaminophen) 1 Each Tablet   


Losartan-Hctz 50-12.5 Mg Tab (Losartan/Hydrochlorothiazide) 1 Each Tablet   


Novolog Flexpen (Insulin Aspart) 100 Unit/1 Ml Insuln.pen 15 Unit SQ 1X


Vitals/I & O





 Vital Sign - Last 24 Hours








 2/21/17 2/21/17 2/21/17 2/21/17





 15:27 19:00 20:00 23:00


 


Temp 97.4 98.1  99.0





 97.4 98.1  99.0


 


Pulse 88 84  83


 


Resp 18 18  18


 


B/P 123/79 128/83  126/85


 


Pulse Ox 97 97  95


 


O2 Delivery Room Air Room Air Room Air Room Air


 


    





    





 2/22/17 2/22/17 2/22/17 2/22/17





 03:00 07:15 08:00 11:39


 


Temp 97.5 98.1  98.1





 97.5 98.1  98.1


 


Pulse 81 86  95


 


Resp 18 14  16


 


B/P 145/91 112/70  108/73


 


Pulse Ox 97 95  97


 


O2 Delivery Room Air Room Air Room Air Room Air














 Intake and Output   


 


 2/21/17 2/21/17 2/22/17





 15:00 23:00 07:00


 


Intake Total 1455 ml 1190 ml 900 ml


 


Output Total  700 ml 350 ml


 


Balance 1455 ml 490 ml 550 ml














LANIE OAKLEY MD Feb 22, 2017 12:59

## 2017-02-22 NOTE — PDOC
Provider Note


Provider Note


Onc consult dictated- [id number]





LLL Lung mass


Newly diagnosed DM


Balance and visual problems- DM vs ca?





Bronch planned tomorrow


Ct A/P ordered- staging of probable lung cancer and eval of possible pancreatic 

lesion as cause of new DM?


Unfortunately renal function limits better imaging with contrast





Will return Fri AM to discuss imaging, bronch. 


Left message for daughter as well.








JEVON PEREZ DO Feb 22, 2017 16:06

## 2017-02-22 NOTE — CONS
DATE OF CONSULTATION:  02/21/2017



PRIMARY PHYSICIAN:  Dr. Larose.



REASON FOR CONSULTATION:  Acute renal failure and electrolyte abnormalities.



HISTORY OF PRESENT ILLNESS:  The patient is a 81-year-old -American

gentleman who is followed by Dr. Lakesha Diaz and Dr. Carreon for at least 20

years that he is aware of.  He claims he has never been told that he had

diabetes.  However, for the last 3-4 weeks, he is getting extremely fatigued and

tired.  He also was unable to swallow his food.  He claims he got stuck and

would come back at times.  He has lost 34 pounds by my calculation, he claims he

was 185, 3 to 4 months ago and it is now down to 151.  He stated in the ER, he

had lost 21 pounds.  He has been drinking at least two gallons of water a day

because he was always so thirsty.  He was extremely fatigued and tired, dizzy,

lightheaded and had significant muscle weakness and presented to the ER for

further evaluation, whereby he was noted to have a sugar of 628, which went up

to 775.  His calcium was mildly elevated.  Potassium was mildly elevated and

sodium was 133.  He was diagnosed with hyperosmolar nonketotic state, although

UA is not available to prove the ketones _____.  Blood pressure is 88/66 at

presentation and he has been given significant amount of IV fluids and currently

is running on IV fluids.  His blood pressure is still somewhat marginal at 85/50

and most recent reading, but he is feeling a whole lot better.  His urine is

clearing up also at this time.



PAST MEDICAL HISTORY:  As documented, newly diagnosed diabetes.



FAMILY HISTORY:  Negative for known kidney problems, or for diabetes that he is

aware of.



For rest of the details, please see electronic records.

 



______________________________

SANDIE HILLIARD MD



DR:  VIVIANE/luz  JOB#:  [id number] / [id number]

DD:  02/21/2017 11:00  DT:  02/22/2017 08:09

## 2017-02-23 VITALS — DIASTOLIC BLOOD PRESSURE: 85 MMHG | SYSTOLIC BLOOD PRESSURE: 129 MMHG

## 2017-02-23 VITALS — SYSTOLIC BLOOD PRESSURE: 128 MMHG | DIASTOLIC BLOOD PRESSURE: 86 MMHG

## 2017-02-23 VITALS — SYSTOLIC BLOOD PRESSURE: 129 MMHG | DIASTOLIC BLOOD PRESSURE: 84 MMHG

## 2017-02-23 VITALS — DIASTOLIC BLOOD PRESSURE: 76 MMHG | SYSTOLIC BLOOD PRESSURE: 123 MMHG

## 2017-02-23 VITALS — SYSTOLIC BLOOD PRESSURE: 131 MMHG | DIASTOLIC BLOOD PRESSURE: 91 MMHG

## 2017-02-23 VITALS — SYSTOLIC BLOOD PRESSURE: 133 MMHG | DIASTOLIC BLOOD PRESSURE: 78 MMHG

## 2017-02-23 VITALS — DIASTOLIC BLOOD PRESSURE: 89 MMHG | SYSTOLIC BLOOD PRESSURE: 132 MMHG

## 2017-02-23 VITALS — DIASTOLIC BLOOD PRESSURE: 82 MMHG | SYSTOLIC BLOOD PRESSURE: 127 MMHG

## 2017-02-23 VITALS — DIASTOLIC BLOOD PRESSURE: 87 MMHG | SYSTOLIC BLOOD PRESSURE: 133 MMHG

## 2017-02-23 VITALS — SYSTOLIC BLOOD PRESSURE: 138 MMHG | DIASTOLIC BLOOD PRESSURE: 74 MMHG

## 2017-02-23 VITALS — DIASTOLIC BLOOD PRESSURE: 81 MMHG | SYSTOLIC BLOOD PRESSURE: 138 MMHG

## 2017-02-23 VITALS — SYSTOLIC BLOOD PRESSURE: 122 MMHG | DIASTOLIC BLOOD PRESSURE: 88 MMHG

## 2017-02-23 VITALS — DIASTOLIC BLOOD PRESSURE: 76 MMHG | SYSTOLIC BLOOD PRESSURE: 130 MMHG

## 2017-02-23 VITALS — SYSTOLIC BLOOD PRESSURE: 139 MMHG | DIASTOLIC BLOOD PRESSURE: 91 MMHG

## 2017-02-23 VITALS — DIASTOLIC BLOOD PRESSURE: 79 MMHG | SYSTOLIC BLOOD PRESSURE: 121 MMHG

## 2017-02-23 VITALS — DIASTOLIC BLOOD PRESSURE: 80 MMHG | SYSTOLIC BLOOD PRESSURE: 124 MMHG

## 2017-02-23 VITALS — SYSTOLIC BLOOD PRESSURE: 118 MMHG | DIASTOLIC BLOOD PRESSURE: 81 MMHG

## 2017-02-23 VITALS — SYSTOLIC BLOOD PRESSURE: 135 MMHG | DIASTOLIC BLOOD PRESSURE: 91 MMHG

## 2017-02-23 VITALS — SYSTOLIC BLOOD PRESSURE: 139 MMHG | DIASTOLIC BLOOD PRESSURE: 80 MMHG

## 2017-02-23 VITALS — DIASTOLIC BLOOD PRESSURE: 91 MMHG | SYSTOLIC BLOOD PRESSURE: 139 MMHG

## 2017-02-23 VITALS — SYSTOLIC BLOOD PRESSURE: 127 MMHG | DIASTOLIC BLOOD PRESSURE: 70 MMHG

## 2017-02-23 VITALS — DIASTOLIC BLOOD PRESSURE: 87 MMHG | SYSTOLIC BLOOD PRESSURE: 138 MMHG

## 2017-02-23 VITALS — SYSTOLIC BLOOD PRESSURE: 140 MMHG | DIASTOLIC BLOOD PRESSURE: 81 MMHG

## 2017-02-23 VITALS — SYSTOLIC BLOOD PRESSURE: 131 MMHG | DIASTOLIC BLOOD PRESSURE: 81 MMHG

## 2017-02-23 VITALS — SYSTOLIC BLOOD PRESSURE: 140 MMHG | DIASTOLIC BLOOD PRESSURE: 90 MMHG

## 2017-02-23 LAB
ALBUMIN SERPL-MCNC: 2.7 G/DL (ref 3.4–5)
ANION GAP SERPL CALC-SCNC: 12 MMOL/L (ref 6–14)
BUN SERPL-MCNC: 20 MG/DL (ref 8–26)
CALCIUM SERPL-MCNC: 9 MG/DL (ref 8.5–10.1)
CHLORIDE SERPL-SCNC: 106 MMOL/L (ref 98–107)
CO2 SERPL-SCNC: 22 MMOL/L (ref 21–32)
CREAT SERPL-MCNC: 1.4 MG/DL (ref 0.7–1.3)
GFR SERPLBLD BASED ON 1.73 SQ M-ARVRAT: 59.2 ML/MIN
GLUCOSE SERPL-MCNC: 170 MG/DL (ref 70–99)
PHOSPHATE SERPL-MCNC: 3.5 MG/DL (ref 2.6–4.7)
POTASSIUM SERPL-SCNC: 4.2 MMOL/L (ref 3.5–5.1)
SODIUM SERPL-SCNC: 140 MMOL/L (ref 136–145)

## 2017-02-23 PROCEDURE — 0B9B8ZX DRAINAGE OF LEFT LOWER LOBE BRONCHUS, VIA NATURAL OR ARTIFICIAL OPENING ENDOSCOPIC, DIAGNOSTIC: ICD-10-PCS | Performed by: INTERNAL MEDICINE

## 2017-02-23 PROCEDURE — 0BBL3ZX EXCISION OF LEFT LUNG, PERCUTANEOUS APPROACH, DIAGNOSTIC: ICD-10-PCS | Performed by: RADIOLOGY

## 2017-02-23 PROCEDURE — 0BBJ8ZX EXCISION OF LEFT LOWER LUNG LOBE, VIA NATURAL OR ARTIFICIAL OPENING ENDOSCOPIC, DIAGNOSTIC: ICD-10-PCS | Performed by: INTERNAL MEDICINE

## 2017-02-23 RX ADMIN — INSULIN ASPART SCH UNITS: 100 INJECTION, SOLUTION INTRAVENOUS; SUBCUTANEOUS at 07:30

## 2017-02-23 RX ADMIN — INSULIN ASPART SCH UNITS: 100 INJECTION, SOLUTION INTRAVENOUS; SUBCUTANEOUS at 08:00

## 2017-02-23 RX ADMIN — TAMSULOSIN HYDROCHLORIDE SCH MG: 0.4 CAPSULE ORAL at 20:20

## 2017-02-23 RX ADMIN — INSULIN ASPART SCH UNITS: 100 INJECTION, SOLUTION INTRAVENOUS; SUBCUTANEOUS at 11:30

## 2017-02-23 RX ADMIN — GLYBURIDE SCH MG: 5 TABLET ORAL at 08:00

## 2017-02-23 RX ADMIN — INSULIN ASPART SCH UNITS: 100 INJECTION, SOLUTION INTRAVENOUS; SUBCUTANEOUS at 17:56

## 2017-02-23 RX ADMIN — BACITRACIN SCH MLS/HR: 5000 INJECTION, POWDER, FOR SOLUTION INTRAMUSCULAR at 17:44

## 2017-02-23 RX ADMIN — INSULIN ASPART SCH UNITS: 100 INJECTION, SOLUTION INTRAVENOUS; SUBCUTANEOUS at 12:00

## 2017-02-23 RX ADMIN — INSULIN DETEMIR SCH UNITS: 100 INJECTION, SOLUTION SUBCUTANEOUS at 17:57

## 2017-02-23 RX ADMIN — GLYBURIDE SCH MG: 5 TABLET ORAL at 17:49

## 2017-02-23 NOTE — PDOC
MODERATE SEDATION ASSESSMENT


RISKS/ALTERNATIVES


Risks/Alternatives


Risks and alternatives of this type of sedation and procedure discussed with:


RISK/ALTERNATIVES:  Patient





H & P ON CHART


H & P


H & P on chart and reviewed for co-morbid conditions and appropriate labs.


H&P ON CHART:  Yes





PREGNANCY STATUS


PREG STATUS ASSESSED:  N/A





MEDS/ALLERGIES REVIEWED


Meds/Allergies Reviewed


Medications and Allergies including time and route of recently administered 

narcotics and sedatives.


MEDS/ALLERGIES REVIEWED:  Yes





ASA RATING


ASA RATING:  III





AIRWAY ASSESSMENT


Airway Assessment


Airway patency, oral function limitations, presence  of caps, crowns, dentures, 

partials, and ability to extend neck assessed.


AIRWAY ASSESSMENT:  Yes





MALLAMPATI SCORE


MALLAMPATI SCORE:  II





PRE-SEDATION ASSESSMENT


PRE-SEDATION ASSESSMENT:  Yes








AAKASH CHACON MD Feb 23, 2017 13:18

## 2017-02-23 NOTE — CONS
DATE OF CONSULTATION:  2017



REFERRING PROVIDER:  Dr. Larose.



REASON FOR CONSULTATION:  Lung mass.



HISTORY OF PRESENT ILLNESS:  The patient is a 81-year-old male who reports

previously seeing his PCP very regularly.  However, in the last couple of

months, he has been experiencing polyuria, polydipsia and a loss of 20 pounds of

weight.  He was admitted through the Emergency Room and found to have a blood

sugar near 800.  Subsequently, his hemoglobin A1c has returned at 15.1.  He had

a CT head, noncontrast completed which was negative.  Chest x-ray showed a

concerning left lower lung lesion; therefore, he underwent CT chest,

noncontrast, which revealed a 4.4 cm left lower lobe irregular spiculated mass. 

It is unclear with this noncontrast study if there is vascular involvement. 

There were also several low density lesions in the liver, but these were thought

to most likely be cysts, he presented with acute kidney insufficiency with a

creatinine of 2.8, now down to 1.7.  He has been having some balance problems,

decreased visual acuity.  Otherwise, he denies any shortness of breath, cough,

chest pain, palpitations, abdominal pain or neuropathy.



PAST MEDICAL HISTORY:  Hypertension, goiter, newly diagnosed diabetes.



PAST SURGICAL HISTORY:  Hernia repair, thyroid surgery.



FAMILY HISTORY:  Mom  at the age of 38 from severe pneumonia.  Dad's health

is unknown.  He has lost a brother to lung cancer and a daughter to uterine

cancer and a son to gastric cancer.



SOCIAL HISTORY:  He retired from iron work.  He previously smoked half a pack a

day for 30 years and quit 10 years ago.  He previously also used to drink

alcohol very heavily, but quit that approximately 10 years ago as well.



ALLERGIES:  No known drug allergies.



CURRENT MEDICATIONS:  Lortab, Flexeril, fentanyl, glyburide, Dilaudid, aspart,

detemir, morphine sulfate, Zofran, Compazine, tamsulosin.



REVIEW OF SYSTEMS:  Twelve point review of systems completed and unremarkable

with the exception of the weight loss, odynophagia, dysphagia, polydipsia,

balance problems, vision changes.



PHYSICAL EXAMINATION:

VITAL SIGNS:  Temperature 98.5, pulse 82, respiratory rate 16, blood pressure

121/78, 96% O2 on room air.

GENERAL:  He is alert and oriented and in no apparent distress.  He appears to

be very active and fit at baseline.

HEENT:  Extraocular muscle strength is intact.  Mucous membranes are moist.

CARDIOVASCULAR:  Heart is regular in rhythm and rate.

LUNGS:  Clear to auscultation bilaterally.

ABDOMEN:  Soft, nontender.  No obvious organomegaly.

EXTREMITIES:  No edema.

NEUROLOGIC:  No focal cranial deficits.  He does seem to struggle with memory

recall at times.



IMAGING AND LABORATORY DATA:  CBC unremarkable.  CMP is notable for alkaline

phosphatase of 129, creatinine 2.8, now down to 1.7.  Glucose markedly elevated

with hemoglobin A1c 15.1. Imaging reviewed as above.



ASSESSMENT AND PLAN:  The patient is a 81-year-old male with the following

medical problems:

1.  Left lower lung 4.4 cm irregular spiculated mass certainly concerning for an

underlying malignancy, especially given his smoking history.  Pulmonary has been

consulted and is planning a bronchoscopy tomorrow.  If there is not a lesion

able to be biopsied, he will need a CT-guided biopsy of this lesion.  There does

not appear to be any associated mediastinal adenopathy.  I have ordered a CT

abdomen and pelvis without IV contrast due to his renal function to further

evaluate these liver lesions.  He has had a CT head, noncontrast that was

negative.  Currently, his renal function prohibits MRI of the head, though I am

somewhat concerned about his balance problems, vision problems and at times

memory difficulties.

2.  Newly diagnosed diabetes with hemoglobin A1c 15.1, currently improving with

medical management per Dr. Larose.  Given that he reports previously being very

healthy, I am interested in the abdominal imaging to make sure that there is not

a pancreatic malignancy.



Thank you for this consultation.  I will continue to follow along, returning

Friday morning to review all of his CT scans, and bronchoscopy.



Per the patient's request, I spoke with his daughter, Lizabeth Miranda about

these findings as well.



______________________________

JEVON PEREZ DO DR:  OUMAR/luz  JOB#:  [id number] / [id number]

DD:  2017 16:03  DT:  2017 06:51

ÁNGELA

## 2017-02-23 NOTE — RAD
EXAM: Chest inspiration/expiration.



HISTORY: Left lung biopsy.



COMPARISON: 2/20/2017.



FINDINGS: There is a tiny left apical pneumothorax.



There is focal atelectasis peripheral to the left hilum. There is no clear

pleural effusion. The heart is not enlarged. There is contrast in the colon.

There is a chronic left lateral rib fracture.



IMPRESSION:

1. Tiny left apical pneumothorax.

## 2017-02-23 NOTE — PDOC
PULMONARY PROGRESS NOTES


Subjective


no soa


Vitals





 Vital Signs








  Date Time  Temp Pulse Resp B/P Pulse Ox O2 Delivery O2 Flow Rate FiO2


 


2/23/17 10:00 98.2 89 20 116/78 98 Nasal Cannula 2 





 98.2       








General:  Alert, No acute distress


Lungs:  Clear


Cardiovascular:  S1


Abdomen:  Soft


Neuro Exam:  Alert


Extremities:  No Edema


Skin:  Warm


Labs





Laboratory Tests








Test


  2/21/17


11:46 2/21/17


12:30 2/21/17


16:59 2/21/17


20:51


 


Glucose (Fingerstick)


  323mg/dL


(70-99) 


  298mg/dL


(70-99) 285mg/dL


(70-99)


 


Urine Collection Type  Unknown   


 


Urine Color  Yellow   


 


Urine Clarity  Clear   


 


Urine pH  5.5   


 


Urine Specific Gravity  1.025   


 


Urine Protein


  


  15.7mg/dL (Not


Estab.) 


  


 


 


Urine Glucose (UA)


  


  >=1000mg/dL


(NEG) 


  


 


 


Urine Ketones (Stick)


  


  Tracemg/dL


(NEG) 


  


 


 


Urine Blood  Small (NEG)   


 


Urine Nitrite  Negative (NEG)   


 


Urine Bilirubin  Negative (NEG)   


 


Urine Urobilinogen Dipstick


  


  0.2mg/dL (0.2


mg/dL) 


  


 


 


Urine Leukocyte Esterase  Moderate (NEG)   


 


Urine RBC  Occ/HPF (0-2)   


 


Urine WBC


  


  11-20/HPF


(0-4) 


  


 


 


Urine Squamous Epithelial


Cells 


  Few/LPF 


  


  


 


 


Urine Bacteria


  


  Few/HPF


(0-FEW) 


  


 


 


Urine Mucus  Slight/LPF   


 


Urine Random Sodium


  


  98mmol/L (Not


Estab.) 


  


 


 


Urine Creatinine


  


  65.7mg/dL (Not


Estab.) 


  


 


 


Urine Protein/Creatinine Ratio


  


  239mg/g creat


(0-200) 


  


 














Test


  2/22/17


06:55 2/22/17


07:28 2/22/17


10:59 2/22/17


15:15


 


Hemoglobin


  11.9g/dL


(13.0-17.5) 


  


  


 


 


Sodium Level


  137mmol/L


(136-145) 


  


  


 


 


Potassium Level


  4.4mmol/L


(3.5-5.1) 


  


  


 


 


Chloride Level


  106mmol/L


() 


  


  


 


 


Carbon Dioxide Level


  20mmol/L


(21-32) 


  


  


 


 


Anion Gap 11 (6-14)    


 


Blood Urea Nitrogen 29mg/dL (8-26)    


 


Creatinine


  1.7mg/dL


(0.7-1.3) 


  


  


 


 


Estimated GFR


(Cockcroft-Gault) 47.3 


  


  


  


 


 


Glucose Level


  279mg/dL


(70-99) 


  


  


 


 


Calcium Level


  9.1mg/dL


(8.5-10.1) 


  


  


 


 


Phosphorus Level


  2.5mg/dL


(2.6-4.7) 


  


  


 


 


Magnesium Level


  1.7mg/dL


(1.8-2.4) 


  


  


 


 


Albumin


  2.6g/dL


(3.4-5.0) 


  


  


 


 


Glucose (Fingerstick)


  


  254mg/dL


(70-99) 369mg/dL


(70-99) 


 


 


Prothrombin Time


  


  


  


  13.4SEC


(11.7-14.0)


 


Prothromb Time International


Ratio 


  


  


  1.1 (0.8-1.1) 


 














Test


  2/22/17


16:59 2/22/17


20:48 2/23/17


03:40 2/23/17


07:52


 


Glucose (Fingerstick)


  185mg/dL


(70-99) 174mg/dL


(70-99) 


  196mg/dL


(70-99)


 


Sodium Level


  


  


  140mmol/L


(136-145) 


 


 


Potassium Level


  


  


  4.2mmol/L


(3.5-5.1) 


 


 


Chloride Level


  


  


  106mmol/L


() 


 


 


Carbon Dioxide Level


  


  


  22mmol/L


(21-32) 


 


 


Anion Gap   12 (6-14)  


 


Blood Urea Nitrogen   20mg/dL (8-26)  


 


Creatinine


  


  


  1.4mg/dL


(0.7-1.3) 


 


 


Estimated GFR


(Cockcroft-Gault) 


  


  59.2 


  


 


 


Glucose Level


  


  


  170mg/dL


(70-99) 


 


 


Calcium Level


  


  


  9.0mg/dL


(8.5-10.1) 


 


 


Phosphorus Level


  


  


  3.5mg/dL


(2.6-4.7) 


 


 


Magnesium Level


  


  


  1.7mg/dL


(1.8-2.4) 


 


 


Albumin


  


  


  2.7g/dL


(3.4-5.0) 


 








Laboratory Tests








Test


  2/22/17


10:59 2/22/17


15:15 2/22/17


16:59 2/22/17


20:48


 


Glucose (Fingerstick)


  369mg/dL


(70-99) 


  185mg/dL


(70-99) 174mg/dL


(70-99)


 


Prothrombin Time


  


  13.4SEC


(11.7-14.0) 


  


 


 


Prothromb Time International


Ratio 


  1.1 (0.8-1.1) 


  


  


 














Test


  2/23/17


03:40 2/23/17


07:52 


  


 


 


Sodium Level


  140mmol/L


(136-145) 


  


  


 


 


Potassium Level


  4.2mmol/L


(3.5-5.1) 


  


  


 


 


Chloride Level


  106mmol/L


() 


  


  


 


 


Carbon Dioxide Level


  22mmol/L


(21-32) 


  


  


 


 


Anion Gap 12 (6-14)    


 


Blood Urea Nitrogen 20mg/dL (8-26)    


 


Creatinine


  1.4mg/dL


(0.7-1.3) 


  


  


 


 


Estimated GFR


(Cockcroft-Gault) 59.2 


  


  


  


 


 


Glucose Level


  170mg/dL


(70-99) 


  


  


 


 


Calcium Level


  9.0mg/dL


(8.5-10.1) 


  


  


 


 


Phosphorus Level


  3.5mg/dL


(2.6-4.7) 


  


  


 


 


Magnesium Level


  1.7mg/dL


(1.8-2.4) 


  


  


 


 


Albumin


  2.7g/dL


(3.4-5.0) 


  


  


 


 


Glucose (Fingerstick)


  


  196mg/dL


(70-99) 


  


 








Medications





Active Scripts








 Medications  Dose


 Route/Sig Days Date Category


 


 Cyclobenzaprine


 Hcl 10 Mg Tablet  


    2/21/17 Reported


 


 Omeprazole 20 Mg


 Capsule.dr  


    2/21/17 Reported


 


 Celecoxib 200 Mg


 Capsule  


    2/21/17 Reported


 


 Hydrocodone-Apap


 5-300


  (Hydrocodone


 Bit/Acetaminophen)


 1 Each Tablet  


    2/21/17 Reported


 


 Losartan-Hctz


 50-12.5 Mg Tab


  (Losartan/Hydrochlorothiazide)


 1 Each Tablet  


    2/21/17 Reported


 


 Novolog Flexpen


  (Insulin Aspart)


 100 Unit/1 Ml


 Insuln.pen  15 Unit


 SQ 1X   2/21/17 Reported











Impression


.


1.  A 4.4 cm left lower lobe mass in the patient, who is an ex-smoker.  He


smoked for 30 years.  He has lost about 20 pounds in the last 2 weeks.  The CT


chest findings are highly concerning for a bronchogenic cancer.  He would


benefit from diagnostic bronchoscopy.


2.  Suspected underlying chronic obstructive pulmonary disease.


3.  Hyperglycemia, present on admission, currently improved.


4.  Acute renal failure present on admission, which is improved with hydration.


5.  Moderate protein-calorie malnutrition.





Plan


.





1.  Discuss with the patient regarding the option of diagnostic bronchoscopy


with biopsies.  All risk and benefits were explained and he agreed to proceed


with the procedure.


2.  If there is no endobronchial lesion on the bronchoscopy, then will proceed 

with


a CT-guided needle aspiration biopsy.


3.  We will also obtain full PFTs.


4.  Hold anticoagulation.


5.  Continue with present bronchodilators.


6.  Discussed with Dr. Larsoe.  We will make further recommendations after review


of the bronchoscopy.








CARLITOS CHURCH MD Feb 23, 2017 10:15

## 2017-02-23 NOTE — PDOC
Exam








Araceli





Pre-Procedure Diagnosis


Pre-Procedure Diagnosis


80 YO previous smoker with wt loss and abnormal CT chest, with left hilar-

parahilar mass suggesting bronchogenic carcinoma.





Post-Procedure Diagnosis


Post-Procedure Diagnosis


Same





Procedure Performed


Procedure Performed


CT guided bx left parahilar lung mass





Type of Anesthesia


Type of Anesthesia


Local + Mod sedation





Estimated Blood Loss


EBL:


Minimal





Specimens


Specimans


4 18G core bx to path in formalin





Condition of Patient


Condition of Patient


Stable.  No apparent complication--no immediate post bx Ptx





Disposition


Disposition


From IR/CT to 524.  2 hr post bx insp/exp CXR requested.  F/u with [facility] and Dr Mena.  Full report to follow.








AAKASH CHACON MD Feb 23, 2017 13:22

## 2017-02-23 NOTE — PDOC
SUBJECTIVE


ROS


MAAYNK/ CKD III





Doign oK, Just back from Bronch





CVS:   no Orthopnea, no CP


RESP:   no SOB, no AMCE; min cough


GI:   no Nausea, no Vomiting


:   no Dysuria, no Urgency





OBJECTIVE


Vital Signs





 Vital Signs








  Date Time  Temp Pulse Resp B/P Pulse Ox O2 Delivery O2 Flow Rate FiO2


 


2/23/17 11:00 97.5 99 20 135/91 96 Room Air  





 97.5       


 


2/23/17 10:00       2 








I & 0











 Intake and Output 


 


 2/23/17





 07:00


 


Intake Total 1760 ml


 


Output Total 1375 ml


 


Balance 385 ml


 


 


 


Intake Oral 1760 ml


 


Output Urine Total 1375 ml


 


# Voids 4











PHYSICAL EXAM


Physical Exam


General Appearance:       Awake       Alert Oriented x  3   In  no  Distress


Eyes:       VIsion Unchanged Conjunctiva Normal


EN:       No EN Drainage  Mucous Memb.    moist


Neck:         no  JVD   no  JVP  Supple   no  Thyromegaly


CVS:       S1 S2   no  Murmur  No Gallop  No Rub   no Edema


Resp:        no  Rales   no  Rhonchi   no  Acc. Muscle use


GI:       BS +ve    NO Bruit   Non Tender   Non Distended


:        no  CVA tenderness;    no  Suprapubic Tenderness














Assessment & Plan





MAYANK - Presumed VMN due to dehydration from ^ed FSBS.nwo resolved - watch off of 

IVF





CKD III -  This may be his new baseline. ? Ischemic Nephropathy with smallish 

Rt Kidney





BPH (without LUTS)  as noted on Renal US - Started Flomax - URO eval as OP if 

Lung ? Cancer prognosis is acceptable





Lwo Phos - better after  IV Na Hpos





lowish Mag - 1 gm today again 





Possible Lung ca - Is his DM a Para-neoplastic Synd - await Bronch results





Discussed Plan of Care  with pt - will be available as needed. F/up with PCP





COMMENT/RELEVANT DATA


Meds





 Current Medications








 Medications


  (Trade)  Dose


 Ordered  Sig/Krystal  Start Time


 Stop Time Status Last Admin


Dose Admin


 


 Acetaminophen


  (Tylenol)  650 mg  PRN Q4HRS  PRN  2/20/17 17:15


 2/21/17 17:14 DC  


 


 


 Acetaminophen/


 Hydrocodone Bitart


  (Lortab 5/325)  1 tab  PRN Q4HRS  PRN  2/21/17 09:45


     


 


 


 Albuterol Sulfate


  (Ventolin Neb


 Soln)  2.5 mg  1X  ONCE  2/23/17 09:10


 2/23/17 10:30 DC 2/23/17 09:10


2.5 MG


 


 Albuterol Sulfate


 2.5 mg  2.5 mg  STK-MED ONCE  2/23/17 09:09


 2/23/17 09:10 DC  


 


 


 Celecoxib


  (Celebrex)  200 mg  PRN BID  PRN  2/21/17 09:45


 2/22/17 12:34 DC  


 


 


 Cyclobenzaprine


 HCl


  (Flexeril)  10 mg  PRN TID  PRN  2/21/17 09:45


     


 


 


 Dextrose  12.5 gm  PRN Q15MIN  PRN  2/20/17 21:45


     


 


 


 Enoxaparin Sodium


  (Lovenox 30mg


 Syringe)  30 mg  QHS  2/20/17 23:00


 2/22/17 14:59 DC 2/21/17 21:58


30 MG


 


 Enoxaparin Sodium


  (Lovenox Per


 Pharmacy


 Prophylaxis


 Dosing)  1 each  PRN DAILY  PRN  2/20/17 22:00


 2/22/17 14:59 DC  


 


 


 Fentanyl Citrate


  (Fentanyl 2ml


 Vial)  50 mcg  PRN Q5MIN  PRN  2/23/17 07:00


 2/23/17 18:00   


 


 


 Glyburide


  (Diabeta)  5 mg  BIDWMEALS  2/21/17 17:00


    2/22/17 18:34


5 MG


 


 Hydrochlorothiazide


  (Microzide)  12.5 mg  DAILY  2/22/17 09:00


 2/22/17 09:00 DC  


 


 


 Hydromorphone HCl


  (Dilaudid)  0.5 mg  PRN Q10MIN  PRN  2/23/17 07:00


 2/23/17 18:00   


 


 


 Influenza Virus


 Vaccine Quadrival


  (Fluarix Quad


 4046-8207 Syringe)  0.5 ml  ONCE ONCE  2/21/17 09:00


 2/21/17 09:04 DC 2/21/17 09:32


0.5 ML


 


 Info


  (Do NOT chart on


 this placeholder)  0.5 each  1X  ONCE  2/21/17 02:45


 2/21/17 02:46 UNV  


 


 


 Insulin Aspart


  (Novolog)  17 units  1X  ONCE  2/21/17 01:15


 2/21/17 01:16 DC 2/21/17 01:28


17 UNITS


 


 Insulin Aspart 8


 units  8 units  TIDAC  2/22/17 12:00


    2/22/17 18:39


8 UNITS


 


 Insulin Detemir


  (Levemir)  25 units  QHS  2/21/17 21:00


 2/21/17 21:00 DC  


 


 


 Insulin Detemir


 15 units  15 units  [id number]  2/22/17 12:30


    2/22/17 12:30


15 UNITS


 


 Lactated Ringer's


  (Iv Lactated


 Ringers)  1,000 ml @ 


 30 mls/hr  Q24H  2/23/17 07:00


 2/23/17 18:59   


 


 


 Lidocaine HCl  2 ml  1X PRN  PRN  2/23/17 07:00


 2/23/17 18:00   


 


 


 Losartan Potassium


  (Cozaar)  50 mg  DAILY  2/22/17 09:00


 2/22/17 09:00 DC  


 


 


 Magnesium Sulfate/


 Dextrose  100 ml @ 


 17 mls/hr  1X  ONCE  2/22/17 13:00


 2/22/17 18:52 DC 2/22/17 14:19


17 MLS/HR


 


 Magnesium Sulfate/


 Dextrose


  (Magnesium


 Sulfate PREMIX


 2GM)  50 ml @ 25


 mls/hr  1X  ONCE  2/22/17 12:30


 2/22/17 14:29 DC  


 


 


 Morphine Sulfate


 1 mg  1 mg  PRN Q10MIN  PRN  2/23/17 07:00


 2/23/17 18:00   


 


 


 Ondansetron HCl


  (Zofran)  4 mg  PRN Q6HRS  PRN  2/23/17 07:00


 2/23/17 18:00   


 


 


 Ondansetron HCl 4


 mg  4 mg  PRN Q8HRS  PRN  2/20/17 17:15


 2/21/17 17:14 DC  


 


 


 Pneumococcal


 Polyvalent Vaccine


  (Do NOT chart on


 this placeholder)  0.5 each  1X  ONCE  2/21/17 02:45


 2/21/17 02:46 UNV  


 


 


 Pneumococcal


 Polyvalent Vaccine


  (Pneumovax 23)  0.5 ml  ONCE ONCE  2/21/17 09:00


 2/21/17 09:04 DC 2/21/17 09:31


0.5 ML


 


 Potassium Chloride


  (Klor-Con)  20 meq  [id number]  2/21/17 08:00


 2/22/17 12:34 DC 2/22/17 09:26


20 MEQ


 


 Prochlorperazine


 Edisylate


  (Compazine)  5 mg  PACU PRN  PRN  2/23/17 07:00


 2/23/17 18:00   


 


 


 Propofol


  (Diprivan)  20 ml @ As


 Directed  STK-MED ONCE  2/23/17 09:35


 2/23/17 09:36 DC  


 


 


 Sodium Chloride


  (Iv Sodium


 Chloride 0.9%


 500ml Bag)  500 ml @ 


 500 mls/hr  1X  ONCE  2/20/17 15:30


 2/20/17 16:29 DC 2/20/17 15:30


500 MLS/HR


 


 Sodium Chloride


  (Iv Sodium


 Chloride 0.9%


 1000ml Bag)  1,000 ml @ 


 125 mls/hr  Q8H  2/20/17 17:04


 2/21/17 17:03 DC 2/21/17 12:26


125 MLS/HR


 


 Sodium Phosphate/


 Dextrose  263.3333


 ml @ 


 64.167 m...  1X  ONCE  2/22/17 13:00


 2/22/17 17:06 DC 2/22/17 14:20


64.167 MLS/HR


 


 Tamsulosin HCl


  (Flomax)  0.4 mg  QHS  2/22/17 21:00


    2/22/17 20:44


0.4 MG








Lab





Laboratory Tests








Test


  2/22/17


15:15 2/22/17


16:59 2/22/17


20:48 2/23/17


03:40


 


Prothrombin Time


  13.4SEC


(11.7-14.0) 


  


  


 


 


Prothromb Time International


Ratio 1.1 (0.8-1.1) 


  


  


  


 


 


Glucose (Fingerstick)


  


  185mg/dL


(70-99) 174mg/dL


(70-99) 


 


 


Sodium Level


  


  


  


  140mmol/L


(136-145)


 


Potassium Level


  


  


  


  4.2mmol/L


(3.5-5.1)


 


Chloride Level


  


  


  


  106mmol/L


()


 


Carbon Dioxide Level


  


  


  


  22mmol/L


(21-32)


 


Anion Gap    12 (6-14) 


 


Blood Urea Nitrogen    20mg/dL (8-26) 


 


Creatinine


  


  


  


  1.4mg/dL


(0.7-1.3)


 


Estimated GFR


(Cockcroft-Gault) 


  


  


  59.2 


 


 


Glucose Level


  


  


  


  170mg/dL


(70-99)


 


Calcium Level


  


  


  


  9.0mg/dL


(8.5-10.1)


 


Phosphorus Level


  


  


  


  3.5mg/dL


(2.6-4.7)


 


Magnesium Level


  


  


  


  1.7mg/dL


(1.8-2.4)


 


Albumin


  


  


  


  2.7g/dL


(3.4-5.0)














Test


  2/23/17


07:52 


  


  


 


 


Glucose (Fingerstick)


  196mg/dL


(70-99) 


  


  


 














SANDIE HILLIARD MD Feb 23, 2017 11:11

## 2017-02-23 NOTE — PDOC
PROGRESS NOTES


Chief Complaint


Chief Complaint


1. Acute Renal Failure


2. Vasomotor nephropathy


3. Diabetes Mellitus, new diagnosis


4. HONK


5. Globus sensation


6. Pulmonary nodule








History of Present Illness


History of Present Illness


Pt laying down in bed awake, and alert when seen and examined this AM.  Pt 

getting his fingerstick glucose, glucose at 196. Pt states that he is feeling 

"ok".  Pt denies any CP or SOA.


Pt ready for bronch this afternoon.  





All questions and concerns addressed and answered.





Vitals


Vitals





 Vital Signs








  Date Time  Temp Pulse Resp B/P Pulse Ox O2 Delivery O2 Flow Rate FiO2


 


2/23/17 03:00 98.6 93 18 121/79 96 Room Air  





 98.6       











Physical Exam


General:  Alert, Oriented X3, Cooperative, No acute distress


Heart:  Regular rate, Normal S1, Normal S2


Lungs:  Clear


Abdomen:  Normal bowel sounds, Soft, No tenderness, No hepatosplenomegaly


Extremities:  No clubbing, No cyanosis, No tenderness/swelling


Skin:  No rashes, No significant lesion, Other (dry skin,  poor turgor,   )





Labs


LABS





Laboratory Tests








Test


  2/22/17


10:59 2/22/17


15:15 2/22/17


16:59 2/22/17


20:48


 


Glucose (Fingerstick)


  369mg/dL


(70-99) 


  185mg/dL


(70-99) 174mg/dL


(70-99)


 


Prothrombin Time


  


  13.4SEC


(11.7-14.0) 


  


 


 


Prothromb Time International


Ratio 


  1.1 (0.8-1.1) 


  


  


 














Test


  2/23/17


03:40 2/23/17


07:52 


  


 


 


Sodium Level


  140mmol/L


(136-145) 


  


  


 


 


Potassium Level


  4.2mmol/L


(3.5-5.1) 


  


  


 


 


Chloride Level


  106mmol/L


() 


  


  


 


 


Carbon Dioxide Level


  22mmol/L


(21-32) 


  


  


 


 


Anion Gap 12 (6-14)    


 


Blood Urea Nitrogen 20mg/dL (8-26)    


 


Creatinine


  1.4mg/dL


(0.7-1.3) 


  


  


 


 


Estimated GFR


(Cockcroft-Gault) 59.2 


  


  


  


 


 


Glucose Level


  170mg/dL


(70-99) 


  


  


 


 


Calcium Level


  9.0mg/dL


(8.5-10.1) 


  


  


 


 


Phosphorus Level


  3.5mg/dL


(2.6-4.7) 


  


  


 


 


Magnesium Level


  1.7mg/dL


(1.8-2.4) 


  


  


 


 


Albumin


  2.7g/dL


(3.4-5.0) 


  


  


 


 


Glucose (Fingerstick)


  


  196mg/dL


(70-99) 


  


 











Review of Systems


Review of Systems


Patient complaint of hunger


Patient complaint of fatigue





Assessment and Plan


Assessmemt and Plan


 Problems


Medical Problems:


(1) ARF (acute renal failure)


Status: Acute  





(2) Dysphagia


Status: Acute  





(3) Dysphagia


Status: Acute  





(4) Hyperglycemia


Status: Acute  





Assessment:





1. Acute Renal Failure


2. Vasomotor nephropathy


3. Diabetes Mellitus, new diagnosis


4. HONK


5. Globus sensation


6. Pulmonary nodule





Plan:





Continue to monitor the patient per floor protocol


Daily labs- CBC, BMP, BUN, and Cr


Monitor daily Glucose


Await Results of Bronch per Pulmonary


Appreciate all Subspecialty input and evaluation


DW RN


Problems:  





Comment


Review of Relevant


I have reviewed the following items emelia (where applicable) has been applied.


Labs





Laboratory Tests








Test


  2/21/17


11:46 2/21/17


12:30 2/21/17


16:59 2/21/17


20:51


 


Glucose (Fingerstick)


  323mg/dL


(70-99) 


  298mg/dL


(70-99) 285mg/dL


(70-99)


 


Urine Collection Type  Unknown   


 


Urine Color  Yellow   


 


Urine Clarity  Clear   


 


Urine pH  5.5   


 


Urine Specific Gravity  1.025   


 


Urine Protein


  


  15.7mg/dL (Not


Estab.) 


  


 


 


Urine Glucose (UA)


  


  >=1000mg/dL


(NEG) 


  


 


 


Urine Ketones (Stick)


  


  Tracemg/dL


(NEG) 


  


 


 


Urine Blood  Small (NEG)   


 


Urine Nitrite  Negative (NEG)   


 


Urine Bilirubin  Negative (NEG)   


 


Urine Urobilinogen Dipstick


  


  0.2mg/dL (0.2


mg/dL) 


  


 


 


Urine Leukocyte Esterase  Moderate (NEG)   


 


Urine RBC  Occ/HPF (0-2)   


 


Urine WBC


  


  11-20/HPF


(0-4) 


  


 


 


Urine Squamous Epithelial


Cells 


  Few/LPF 


  


  


 


 


Urine Bacteria


  


  Few/HPF


(0-FEW) 


  


 


 


Urine Mucus  Slight/LPF   


 


Urine Random Sodium


  


  98mmol/L (Not


Estab.) 


  


 


 


Urine Creatinine


  


  65.7mg/dL (Not


Estab.) 


  


 


 


Urine Protein/Creatinine Ratio


  


  239mg/g creat


(0-200) 


  


 














Test


  2/22/17


06:55 2/22/17


07:28 2/22/17


10:59 2/22/17


15:15


 


Hemoglobin


  11.9g/dL


(13.0-17.5) 


  


  


 


 


Sodium Level


  137mmol/L


(136-145) 


  


  


 


 


Potassium Level


  4.4mmol/L


(3.5-5.1) 


  


  


 


 


Chloride Level


  106mmol/L


() 


  


  


 


 


Carbon Dioxide Level


  20mmol/L


(21-32) 


  


  


 


 


Anion Gap 11 (6-14)    


 


Blood Urea Nitrogen 29mg/dL (8-26)    


 


Creatinine


  1.7mg/dL


(0.7-1.3) 


  


  


 


 


Estimated GFR


(Cockcroft-Gault) 47.3 


  


  


  


 


 


Glucose Level


  279mg/dL


(70-99) 


  


  


 


 


Calcium Level


  9.1mg/dL


(8.5-10.1) 


  


  


 


 


Phosphorus Level


  2.5mg/dL


(2.6-4.7) 


  


  


 


 


Magnesium Level


  1.7mg/dL


(1.8-2.4) 


  


  


 


 


Albumin


  2.6g/dL


(3.4-5.0) 


  


  


 


 


Glucose (Fingerstick)


  


  254mg/dL


(70-99) 369mg/dL


(70-99) 


 


 


Prothrombin Time


  


  


  


  13.4SEC


(11.7-14.0)


 


Prothromb Time International


Ratio 


  


  


  1.1 (0.8-1.1) 


 














Test


  2/22/17


16:59 2/22/17


20:48 2/23/17


03:40 2/23/17


07:52


 


Glucose (Fingerstick)


  185mg/dL


(70-99) 174mg/dL


(70-99) 


  196mg/dL


(70-99)


 


Sodium Level


  


  


  140mmol/L


(136-145) 


 


 


Potassium Level


  


  


  4.2mmol/L


(3.5-5.1) 


 


 


Chloride Level


  


  


  106mmol/L


() 


 


 


Carbon Dioxide Level


  


  


  22mmol/L


(21-32) 


 


 


Anion Gap   12 (6-14)  


 


Blood Urea Nitrogen   20mg/dL (8-26)  


 


Creatinine


  


  


  1.4mg/dL


(0.7-1.3) 


 


 


Estimated GFR


(Cockcroft-Gault) 


  


  59.2 


  


 


 


Glucose Level


  


  


  170mg/dL


(70-99) 


 


 


Calcium Level


  


  


  9.0mg/dL


(8.5-10.1) 


 


 


Phosphorus Level


  


  


  3.5mg/dL


(2.6-4.7) 


 


 


Magnesium Level


  


  


  1.7mg/dL


(1.8-2.4) 


 


 


Albumin


  


  


  2.7g/dL


(3.4-5.0) 


 








Laboratory Tests








Test


  2/22/17


10:59 2/22/17


15:15 2/22/17


16:59 2/22/17


20:48


 


Glucose (Fingerstick)


  369mg/dL


(70-99) 


  185mg/dL


(70-99) 174mg/dL


(70-99)


 


Prothrombin Time


  


  13.4SEC


(11.7-14.0) 


  


 


 


Prothromb Time International


Ratio 


  1.1 (0.8-1.1) 


  


  


 














Test


  2/23/17


03:40 2/23/17


07:52 


  


 


 


Sodium Level


  140mmol/L


(136-145) 


  


  


 


 


Potassium Level


  4.2mmol/L


(3.5-5.1) 


  


  


 


 


Chloride Level


  106mmol/L


() 


  


  


 


 


Carbon Dioxide Level


  22mmol/L


(21-32) 


  


  


 


 


Anion Gap 12 (6-14)    


 


Blood Urea Nitrogen 20mg/dL (8-26)    


 


Creatinine


  1.4mg/dL


(0.7-1.3) 


  


  


 


 


Estimated GFR


(Cockcroft-Gault) 59.2 


  


  


  


 


 


Glucose Level


  170mg/dL


(70-99) 


  


  


 


 


Calcium Level


  9.0mg/dL


(8.5-10.1) 


  


  


 


 


Phosphorus Level


  3.5mg/dL


(2.6-4.7) 


  


  


 


 


Magnesium Level


  1.7mg/dL


(1.8-2.4) 


  


  


 


 


Albumin


  2.7g/dL


(3.4-5.0) 


  


  


 


 


Glucose (Fingerstick)


  


  196mg/dL


(70-99) 


  


 








Medications





 Current Medications


Sodium Chloride 500 ml @  500 mls/hr 1X  ONCE IV  Last administered on 2/20/ 17at 15:30;  Start 2/20/17 at 15:30;  Stop 2/20/17 at 16:29;  Status DC


Sodium Chloride (Iv Sodium Chloride 0.9% 1000ml Bag) 1,000 ml @  510 mls/hr 

Q1H58M IV ;  Start 2/20/17 at 17:15;  Stop 2/20/17 at 21:15;  Status DC


Ondansetron HCl 4 mg 4 mg PRN Q8HRS  PRN IV NAUSEA/VOMITING;  Start 2/20/17 at 

17:15;  Stop 2/21/17 at 17:14;  Status DC


Sodium Chloride (Iv Sodium Chloride 0.9% 1000ml Bag) 1,000 ml @  125 mls/hr Q8H 

IV  Last administered on 2/21/17at 12:26;  Start 2/20/17 at 17:04;  Stop 2/21/ 17 at 17:03;  Status DC


Acetaminophen (Tylenol) 650 mg PRN Q4HRS  PRN PO FEVER;  Start 2/20/17 at 17:15

;  Stop 2/21/17 at 17:14;  Status DC


Insulin Aspart (Novolog) 0-9 UNITS TIDWMEALS SQ  Last administered on 2/22/17at 

18:38;  Start 2/21/17 at 08:00


Dextrose 12.5 gm PRN Q15MIN  PRN IV SEE COMMENTS;  Start 2/20/17 at 21:45


Insulin Aspart (Novolog) 5 units TIDAC SQ ;  Start 2/21/17 at 07:30;  Stop 2/21/ 17 at 07:30;  Status DC


Insulin Detemir (Levemir) 10 units QHS SQ  Last administered on 2/20/17at 22:37

;  Start 2/20/17 at 22:00;  Stop 2/21/17 at 00:57;  Status DC


Insulin Aspart (Novolog) 10 units 1X  ONCE SQ  Last administered on 2/20/17at 22

:36;  Start 2/20/17 at 22:30;  Stop 2/20/17 at 22:31;  Status DC


Potassium Chloride (Klor-Con) 20 meq DAILYWBKFT PO  Last administered on 2/22/ 17at 09:26;  Start 2/21/17 at 08:00;  Stop 2/22/17 at 12:34;  Status DC


Enoxaparin Sodium (Lovenox Per Pharmacy Prophylaxis Dosing) 1 each PRN DAILY  

PRN MC SEE COMMENTS;  Start 2/20/17 at 22:00;  Stop 2/22/17 at 14:59;  Status DC


Enoxaparin Sodium (Lovenox 30mg Syringe) 30 mg QHS SQ  Last administered on 2/21 /17at 21:58;  Start 2/20/17 at 23:00;  Stop 2/22/17 at 14:59;  Status DC


Insulin Aspart (Novolog) 15 units 1X  ONCE SQ  Last administered on 2/21/17at 00

:11;  Start 2/21/17 at 00:30;  Stop 2/21/17 at 00:31;  Status DC


Insulin Aspart (Novolog) 15 units TIDAC SQ ;  Start 2/21/17 at 07:30;  Stop 2/21 /17 at 09:36;  Status DC


Insulin Detemir (Levemir) 25 units QHS SQ ;  Start 2/21/17 at 21:00;  Stop 2/21/ 17 at 21:00;  Status DC


Insulin Aspart (Novolog) 17 units 1X  ONCE SQ  Last administered on 2/21/17at 01

:28;  Start 2/21/17 at 01:15;  Stop 2/21/17 at 01:16;  Status DC


Info (Do NOT chart on this placeholder) 0.5 each 1X  ONCE MC ;  Start 2/21/17 

at 02:45;  Stop 2/21/17 at 02:46;  Status UNV


Pneumococcal Polyvalent Vaccine (Do NOT chart on this placeholder) 0.5 each 1X  

ONCE MC ;  Start 2/21/17 at 02:45;  Stop 2/21/17 at 02:46;  Status UNV


Influenza Virus Vaccine Quadrival (Fluarix Quad 1139-6911 Syringe) 0.5 ml ONCE 

ONCE VAX IM  Last administered on 2/21/17at 09:32;  Start 2/21/17 at 09:00;  

Stop 2/21/17 at 09:04;  Status DC


Pneumococcal Polyvalent Vaccine (Pneumovax 23) 0.5 ml ONCE ONCE VAX IM  Last 

administered on 2/21/17at 09:31;  Start 2/21/17 at 09:00;  Stop 2/21/17 at 09:04

;  Status DC


Celecoxib (Celebrex) 200 mg PRN BID  PRN PO PAIN;  Start 2/21/17 at 09:45;  

Stop 2/22/17 at 12:34;  Status DC


Cyclobenzaprine HCl (Flexeril) 10 mg PRN TID  PRN PO SPASMS;  Start 2/21/17 at 

09:45


Acetaminophen/ Hydrocodone Bitart (Lortab 5/325) 1 tab PRN Q4HRS  PRN PO 

MODERATE PAIN;  Start 2/21/17 at 09:45


Losartan Potassium (Cozaar) 50 mg DAILY PO ;  Start 2/22/17 at 09:00;  Stop 2/22 /17 at 09:00;  Status DC


Hydrochlorothiazide 12.5 mg 12.5 mg DAILY PO ;  Start 2/22/17 at 09:00;  Stop 2/ 22/17 at 09:00;  Status DC


Magnesium Sulfate/ Dextrose (Magnesium Sulfate PREMIX 2GM) 50 ml @ 25 mls/hr 

PRN DAILY  PRN IV for Mag < 1.7 on am labs;  Start 2/21/17 at 11:00


Glyburide (Diabeta) 5 mg BIDWMEALS PO  Last administered on 2/22/17at 18:34;  

Start 2/21/17 at 17:00


Insulin Detemir 15 units 15 units DAILY08 SQ  Last administered on 2/22/17at 12:

30;  Start 2/22/17 at 12:30


Magnesium Sulfate/ Dextrose (Magnesium Sulfate PREMIX 2GM) 50 ml @ 25 mls/hr 1X

  ONCE IV ;  Start 2/22/17 at 12:30;  Stop 2/22/17 at 14:29;  Status DC


Insulin Aspart 8 units 8 units TIDAC SQ  Last administered on 2/22/17at 18:39;  

Start 2/22/17 at 12:00


Magnesium Sulfate/ Dextrose 100 ml @  17 mls/hr 1X  ONCE IV  Last administered 

on 2/22/17at 14:19;  Start 2/22/17 at 13:00;  Stop 2/22/17 at 18:52;  Status DC


Sodium Phosphate/ Dextrose 263.3333 ml @  64.167 m... 1X  ONCE IV  Last 

administered on 2/22/17at 14:20;  Start 2/22/17 at 13:00;  Stop 2/22/17 at 17:06

;  Status DC


Tamsulosin HCl (Flomax) 0.4 mg QHS PO  Last administered on 2/22/17at 20:44;  

Start 2/22/17 at 21:00


Ondansetron HCl (Zofran) 4 mg PRN Q6HRS  PRN IV Nausea;  Start 2/23/17 at 07:00

;  Stop 2/23/17 at 18:00


Fentanyl Citrate (Fentanyl 2ml Vial) 25 mcg PRN Q5MIN  PRN IV MILD PAIN;  Start 

2/23/17 at 07:00;  Stop 2/23/17 at 18:00


Fentanyl Citrate (Fentanyl 2ml Vial) 50 mcg PRN Q5MIN  PRN IV MODERATE PAIN;  

Start 2/23/17 at 07:00;  Stop 2/23/17 at 18:00


Morphine Sulfate 1 mg 1 mg PRN Q10MIN  PRN IV SEVERE PAIN;  Start 2/23/17 at 07:

00;  Stop 2/23/17 at 18:00


Lactated Ringer's (Iv Lactated Ringers) 1,000 ml @  30 mls/hr Q24H IV ;  Start 2 /23/17 at 07:00;  Stop 2/23/17 at 18:59


Lidocaine HCl 2 ml 1X PRN  PRN ID IV START;  Start 2/23/17 at 07:00;  Stop 2/23/ 17 at 18:00


Hydromorphone HCl (Dilaudid) 0.5 mg PRN Q10MIN  PRN IV SEVERE PAIN, Second 

choice;  Start 2/23/17 at 07:00;  Stop 2/23/17 at 18:00


Prochlorperazine Edisylate (Compazine) 5 mg PACU PRN  PRN IV NAUSEA;  Start 2/23 /17 at 07:00;  Stop 2/23/17 at 18:00





Active Scripts


Active


Reported


Cyclobenzaprine Hcl 10 Mg Tablet   


Omeprazole 20 Mg Capsule.dr   


Celecoxib 200 Mg Capsule   


Hydrocodone-Apap 5-300 (Hydrocodone Bit/Acetaminophen) 1 Each Tablet   


Losartan-Hctz 50-12.5 Mg Tab (Losartan/Hydrochlorothiazide) 1 Each Tablet   


Novolog Flexpen (Insulin Aspart) 100 Unit/1 Ml Insuln.pen 15 Unit SQ 1X


Vitals/I & O





 Vital Sign - Last 24 Hours








 2/22/17 2/22/17 2/22/17 2/22/17





 11:39 15:37 19:00 20:00


 


Temp 98.1 98.5 98.7 





 98.1 98.5 98.7 


 


Pulse 95 82 96 


 


Resp 16 16 18 


 


B/P 108/73 121/78 150/70 


 


Pulse Ox 97 96 96 


 


O2 Delivery Room Air Room Air Room Air Room Air


 


    





    





 2/22/17 2/23/17  





 23:00 03:00  


 


Temp 98.6 98.6  





 98.6 98.6  


 


Pulse 90 93  


 


Resp 18 18  


 


B/P 131/81 121/79  


 


Pulse Ox 97 96  


 


O2 Delivery Room Air Room Air  














 Intake and Output   


 


 2/22/17 2/22/17 2/23/17





 15:00 23:00 07:00


 


Intake Total 480 ml 1280 ml 


 


Output Total  550 ml 825 ml


 


Balance 480 ml 730 ml -825 ml














MARCO FUNEZ III DO Feb 23, 2017 09:10

## 2017-02-23 NOTE — OP
DATE OF SURGERY:  



BRONCHOSCOPY NOTE



INDICATION:  Lung mass.



DESCRIPTION OF PROCEDURE:  Informed consent was obtained from the patient.  All

risks and benefits were explained.  He agreed to proceed with the procedure. 

Sedation ____ was given by anesthesia.  Bronchoscopy was introduced into the

left nostril.  The upper airway was passed.  Vocal cords moves equally with

respiration.  The trachea was entered.  No tracheal lesions seen.  The yimi

was sharp.  The right lung was first examined.  All subsegments of right upper

lobe, right middle and right lower lobe were patent.  No endobronchial lesions

seen.  No purulent secretions seen.  The bronchoscope was introduced into the

left lung.  All the subsegments of left upper lobe and lingula were examined. 

There were no lesions.  Upon inspection of the left lower lobe, there was

slightly indentation at the lateral subsegment of the left lower lobe.  This was

probably caused by some extrinsic compression.  Cytology brush from that area

was performed.  Biopsies could not be done from that area.  Bronchoalveolar

lavage performed from left lower lobe.  This would be a nondiagnostic

bronchoscopy.  The patient tolerated the procedure well.



IMPRESSION:

1.  No definite endobronchial lesions seen except mild indentation at the

lateral subsegment of the left lower lobe caused by extrinsic compression by the

tumor.

2.  Cytology brush was performed from that area along with bronchoalveolar

lavage.

3.  I would recommend doing a CT-guided biopsy.

4.  Follow the results of the bronchoscopy.

 



______________________________

CARLITOS CHURCH MD



DR:  [email or web address]  JOB#:  [id number] / [id number]

DD:  02/23/2017 10:14  DT:  02/23/2017 10:45

ÁNGELA

## 2017-02-23 NOTE — RAD
CT of the abdomen and pelvis without contrast, 2/22/2017:



History: Probable lung cancer, staging



Multidetector CT imaging was performed following oral ingestion of contrast.

No IV contrast was administered due to the patient's renal insufficiency.



Several well-defined low-density lesions are present in both lobes of the

liver. The largest of these demonstrate a low internal CT number is compatible

with cysts. The gallbladder is unremarkable.



There is a suggestion of pancreatic ductal dilatation, not clearly defined on

these noncontrast scans. No pancreatic mass is evident. The spleen is of

normal size. There is a small cyst laterally in the left kidney. The

unopacified kidneys are otherwise unremarkable. No adrenal abnormality is

detected.



Aortoiliac calcific plaquing is present without evidence of aneurysm. No

abdominal or pelvic adenopathy is seen.



The bowel loops are not dilated. No free fluid or free air is evident in the

abdomen or pelvis.



There is a right inguinal hernia. The dilated right inguinal canal contains

probably fat. No bowel herniation is evident.



Moderate degenerative change is present in the lower lumbar spine.





IMPRESSION:

1. Hepatic and left renal cysts.

2. 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

3. Right inguinal hernia.

4. No CT evidence of metastatic disease in the abdomen or pelvis.











PQRS Compliance Statement:



One or more of the following individualized dose reduction techniques were

utilized for this examination:

1. Automated exposure control

2. Adjustment of the mA and/or kV according to patient size

3. Use of iterative reconstruction technique

## 2017-02-24 VITALS — DIASTOLIC BLOOD PRESSURE: 92 MMHG | SYSTOLIC BLOOD PRESSURE: 130 MMHG

## 2017-02-24 VITALS — DIASTOLIC BLOOD PRESSURE: 71 MMHG | SYSTOLIC BLOOD PRESSURE: 117 MMHG

## 2017-02-24 VITALS — DIASTOLIC BLOOD PRESSURE: 85 MMHG | SYSTOLIC BLOOD PRESSURE: 130 MMHG

## 2017-02-24 VITALS — DIASTOLIC BLOOD PRESSURE: 87 MMHG | SYSTOLIC BLOOD PRESSURE: 138 MMHG

## 2017-02-24 VITALS — SYSTOLIC BLOOD PRESSURE: 139 MMHG | DIASTOLIC BLOOD PRESSURE: 90 MMHG

## 2017-02-24 VITALS — SYSTOLIC BLOOD PRESSURE: 145 MMHG | DIASTOLIC BLOOD PRESSURE: 94 MMHG

## 2017-02-24 VITALS — SYSTOLIC BLOOD PRESSURE: 141 MMHG | DIASTOLIC BLOOD PRESSURE: 89 MMHG

## 2017-02-24 VITALS — DIASTOLIC BLOOD PRESSURE: 87 MMHG | SYSTOLIC BLOOD PRESSURE: 141 MMHG

## 2017-02-24 VITALS — SYSTOLIC BLOOD PRESSURE: 121 MMHG | DIASTOLIC BLOOD PRESSURE: 59 MMHG

## 2017-02-24 VITALS — SYSTOLIC BLOOD PRESSURE: 135 MMHG | DIASTOLIC BLOOD PRESSURE: 75 MMHG

## 2017-02-24 VITALS — SYSTOLIC BLOOD PRESSURE: 125 MMHG | DIASTOLIC BLOOD PRESSURE: 83 MMHG

## 2017-02-24 VITALS — DIASTOLIC BLOOD PRESSURE: 74 MMHG | SYSTOLIC BLOOD PRESSURE: 123 MMHG

## 2017-02-24 VITALS — DIASTOLIC BLOOD PRESSURE: 89 MMHG | SYSTOLIC BLOOD PRESSURE: 136 MMHG

## 2017-02-24 VITALS — DIASTOLIC BLOOD PRESSURE: 81 MMHG | SYSTOLIC BLOOD PRESSURE: 153 MMHG

## 2017-02-24 VITALS — DIASTOLIC BLOOD PRESSURE: 86 MMHG | SYSTOLIC BLOOD PRESSURE: 145 MMHG

## 2017-02-24 VITALS — SYSTOLIC BLOOD PRESSURE: 139 MMHG | DIASTOLIC BLOOD PRESSURE: 86 MMHG

## 2017-02-24 LAB
ALBUMIN SERPL-MCNC: 2.7 G/DL (ref 3.4–5)
ANION GAP SERPL CALC-SCNC: 8 MMOL/L (ref 6–14)
ANION GAP SERPL CALC-SCNC: 8 MMOL/L (ref 6–14)
BASOPHILS # BLD AUTO: 0 X10^3/UL (ref 0–0.2)
BASOPHILS NFR BLD: 0 % (ref 0–3)
BUN SERPL-MCNC: 12 MG/DL (ref 8–26)
BUN SERPL-MCNC: 13 MG/DL (ref 8–26)
CALCIUM SERPL-MCNC: 8.9 MG/DL (ref 8.5–10.1)
CALCIUM SERPL-MCNC: 9 MG/DL (ref 8.5–10.1)
CHLORIDE SERPL-SCNC: 106 MMOL/L (ref 98–107)
CHLORIDE SERPL-SCNC: 107 MMOL/L (ref 98–107)
CO2 SERPL-SCNC: 25 MMOL/L (ref 21–32)
CO2 SERPL-SCNC: 26 MMOL/L (ref 21–32)
CREAT SERPL-MCNC: 1.3 MG/DL (ref 0.7–1.3)
CREAT SERPL-MCNC: 1.3 MG/DL (ref 0.7–1.3)
EOSINOPHIL NFR BLD: 2 % (ref 0–3)
ERYTHROCYTE [DISTWIDTH] IN BLOOD BY AUTOMATED COUNT: 14.3 % (ref 11.5–14.5)
GFR SERPLBLD BASED ON 1.73 SQ M-ARVRAT: 64.4 ML/MIN
GFR SERPLBLD BASED ON 1.73 SQ M-ARVRAT: 64.4 ML/MIN
GLUCOSE SERPL-MCNC: 149 MG/DL (ref 70–99)
GLUCOSE SERPL-MCNC: 150 MG/DL (ref 70–99)
HCT VFR BLD CALC: 34.8 % (ref 39–53)
HGB BLD-MCNC: 11.4 G/DL (ref 13–17.5)
LYMPHOCYTES # BLD: 1.1 X10^3/UL (ref 1–4.8)
LYMPHOCYTES NFR BLD AUTO: 23 % (ref 24–48)
MCH RBC QN AUTO: 28 PG (ref 25–35)
MCHC RBC AUTO-ENTMCNC: 33 G/DL (ref 31–37)
MCV RBC AUTO: 84 FL (ref 79–100)
MONOCYTES NFR BLD: 7 % (ref 0–9)
NEUTROPHILS NFR BLD AUTO: 68 % (ref 31–73)
PHOSPHATE SERPL-MCNC: 3.2 MG/DL (ref 2.6–4.7)
PLATELET # BLD AUTO: 103 X10^3/UL (ref 140–400)
POTASSIUM SERPL-SCNC: 4.4 MMOL/L (ref 3.5–5.1)
POTASSIUM SERPL-SCNC: 4.7 MMOL/L (ref 3.5–5.1)
RBC # BLD AUTO: 4.16 X10^6/UL (ref 4.3–5.7)
SODIUM SERPL-SCNC: 139 MMOL/L (ref 136–145)
SODIUM SERPL-SCNC: 141 MMOL/L (ref 136–145)
WBC # BLD AUTO: 4.5 X10^3/UL (ref 4–11)

## 2017-02-24 PROCEDURE — 0W9B30Z DRAINAGE OF LEFT PLEURAL CAVITY WITH DRAINAGE DEVICE, PERCUTANEOUS APPROACH: ICD-10-PCS | Performed by: RADIOLOGY

## 2017-02-24 RX ADMIN — BACITRACIN SCH MLS/HR: 5000 INJECTION, POWDER, FOR SOLUTION INTRAMUSCULAR at 01:24

## 2017-02-24 RX ADMIN — INSULIN ASPART SCH UNITS: 100 INJECTION, SOLUTION INTRAVENOUS; SUBCUTANEOUS at 12:49

## 2017-02-24 RX ADMIN — HYDROCODONE BITARTRATE AND ACETAMINOPHEN PRN TAB: 5; 325 TABLET ORAL at 21:11

## 2017-02-24 RX ADMIN — INSULIN ASPART SCH UNITS: 100 INJECTION, SOLUTION INTRAVENOUS; SUBCUTANEOUS at 12:39

## 2017-02-24 RX ADMIN — GLYBURIDE SCH MG: 5 TABLET ORAL at 08:00

## 2017-02-24 RX ADMIN — INSULIN ASPART SCH UNITS: 100 INJECTION, SOLUTION INTRAVENOUS; SUBCUTANEOUS at 07:30

## 2017-02-24 RX ADMIN — GLYBURIDE SCH MG: 5 TABLET ORAL at 17:01

## 2017-02-24 RX ADMIN — INSULIN DETEMIR SCH UNITS: 100 INJECTION, SOLUTION SUBCUTANEOUS at 12:48

## 2017-02-24 RX ADMIN — TAMSULOSIN HYDROCHLORIDE SCH MG: 0.4 CAPSULE ORAL at 21:03

## 2017-02-24 RX ADMIN — INSULIN ASPART SCH UNITS: 100 INJECTION, SOLUTION INTRAVENOUS; SUBCUTANEOUS at 17:07

## 2017-02-24 RX ADMIN — HYDROCODONE BITARTRATE AND ACETAMINOPHEN PRN TAB: 5; 325 TABLET ORAL at 12:54

## 2017-02-24 RX ADMIN — INSULIN ASPART SCH UNITS: 100 INJECTION, SOLUTION INTRAVENOUS; SUBCUTANEOUS at 08:00

## 2017-02-24 NOTE — PDOC
Exam








Araceli





Assistant


Assistant


F Ndumbu





Pre-Procedure Diagnosis


Pre-Procedure Diagnosis


Post left lung bx enlarging Ptx





Post-Procedure Diagnosis


Post-Procedure Diagnosis


Same





Procedure Performed


Procedure Performed


CT guided left chest tube insertion





Type of Anesthesia


Type of Anesthesia


Local + Mod sedation





Estimated Blood Loss


EBL:


Minimal





Drain/Tubes


Drains/Tubes


12F locking pigtail left chest tube---to PleurEvac





Condition of Patient


Condition of Patient


Stable.  No apparent complication.





Disposition


Disposition


From IR/CT return to [facility].  Chest tube to wall suction at -20 cm H2O.   Chest 

tube management per Dr Mena.  Full report to follow.








AAKASH CHACON MD Feb 24, 2017 10:03

## 2017-02-24 NOTE — RAD
AP chest, inspiration and expiration views, 2/24/2017, 12:02 PM:



History: Follow-up chest tube insertion



Comparison is made to the study of earlier the same day. A pigtail pleural

drain has been placed on the left with evacuation of the previously seen

pneumothorax. No significant residual pneumothorax is evident. A mass with

adjacent infiltrate is again noted in the left parahilar region. There is mild

streaky atelectasis and/or scarring in the right parahilar region. No

significant pleural fluid is seen.



IMPRESSION: Resolution of the left pneumothorax status post pleural drain

placement.

## 2017-02-24 NOTE — PATHOLOGY
CYTOPATHOLOGY REPORT

 

CLINICAL HISTORY:

Lung mass. See also [id number].

 

SPECIMEN(S) RECEIVED:

A.Bronchoalveolar lavage, LLL

B.Bronchial brushing, LLL

C.Bronchial brush rinse, NOS

 

FINAL DIAGNOSIS:

A.  Left lower lobe bronchoalveolar lavage, ThinPrep:

 - No malignant cells identified.  - Bronchial epithelial cells, few

squamous epithelial cells, and few pulmonary macrophages identified. 

B.  Left lower lobe bronchial brushing, smears:

 - No malignant cells identified.  - Reactive bronchial epithelial

cells and few pulmonary macrophages identified. 

C.  Bronchial brush rinse, ThinPrep:

 - No malignant cells identified.  - Focally reactive bronchial

epithelial cells and few pulmonary macrophages identified.

(JPM:csd; d/t: 2017)  

 

PATHOLOGIST:   Suresh Rios M.D.

REPORT ELECTRONICALLY SIGNED BY:   Suresh Rios M.D.

DATE/TIME:   2017 15:13

GROSS PATHOLOGY:

A.  Bronchoalveolar lavage, LLL:  The specimen is submitted unfixed,

labeled "Joey Marin".  Received by the Cytology Department is

one mL of clear colorless fluid.  One ThinPrep slide was prepared.  

B.  Bronchial brushing, LLL:  The specimen is labeled "Joey Marin" and consists of two fixed slides.  

C.  Bronchial brush rinse, NOS:  The specimen is labeled "Joey Breen" and consists of a brush tip in fixative.  One ThinPrep slide

was prepared.  (clt 2017)

 

 

CYTOTECHNOLOGIST(S):  JANELL Chan([facility])

INITIAL CPT CODE(S):

A; 56333

B; 16372

C; 06320

Professional services performed by [facility] [place] 

[place] [address]

 

  Technical services performed by [facility] at [address] [place] [address], [address]

[address], [place] [address].

 

 

PATIENT:   JOEY MARIN

/AGE:   1935 (Age: 81)     SEX:   M

PATIENT #:   [id number]

ACCESSION #:   [id number]       ALT CASE #:   

SPECIMEN COLLECTION DATE:   2017

SPECIMEN RECEIVED DATE:   2017

[facility]

[address] [place] [address], [address]

[place]  [address]

PHONE:  [phone]

DIRECTOR:  Luis Alfredo W. Kerley, M.D.

* * *  END OF REPORT  * * *

## 2017-02-24 NOTE — RAD
CT-guided left lung biopsy



Indication: 81-year-old male previous smoker with weight loss and with a large

left hilar/parahilar lung mass, suggesting bronchogenic carcinoma. 

Bronchoscopy was nondiagnostic. CT-guided biopsy has been requested.



Anesthesia: 44 minutes moderate sedation provided utilizing a total of 1 mg

Versed and 50 mcg fentanyl, IV. The patient was appropriately monitored by a

qualified independent observer throughout the time of moderate sedation.



Consent: The procedure was explained in its entirety to the patient and/or the

patient's designated representative by a member of the treatment team. This

included a discussion of risks and benefits and acceptable alternatives to the

procedure, as well as expected consequences of no treatment at all. Discussion

of risks included, but was not limited to, those that are most frequent and

those that are rare, but possibly severe or life-threatening, as well as the

possibility of unforeseen complications.



Contrast material: 50 cc Omnipaque 300.





Procedure: Informed consent was obtained from the patient. He was placed prone

on the CT scanner. Preliminary precontrast CT images confirmed the previously

described noncalcified, confluent left hilar/infrahilar left lower lobe soft

tissue mass.  Dynamic IV contrast CT images were then performed, providing

visualization of major pulmonary vessels.  A left posterior skin site suitable

for CT-guided biopsy was selected and marked. That area was prepped and draped

in the usual sterile fashion. Moderate sedation was provided with IV Versed

and fentanyl. Using aseptic technique, local anesthesia, and CT guidance, a

17-gauge guide needle was successfully advanced into posterior aspect of the

infrahilar soft tissue mass. A total of 4 18-gauge core biopsy samples were

obtained. Biopsy material was submitted in formalin to pathology. The biopsy

guide needle was removed and a sterile dressing was applied.  Completion CT

images revealed a minimal postbiopsy pneumothorax. A follow-up

aspiration/expiration chest x-ray will be obtained in 2 hours.



Impression: Successful CT-guided left lung biopsy.  Completion CT images

revealed a minimal postbiopsy pneumothorax. A follow-up aspiration/expiration

chest x-ray will be obtained in 2 hours, for further evaluation.





[employer] Compliance Statement:



One or more of the following individualized dose reduction techniques was

utilized for this procedure:

1. Automated exposure control.

2. Adjustment of MA and/or KV according to patient size.

3. Iterative reconstruction technique.

## 2017-02-24 NOTE — PATHOLOGY
PATHOLOGY REPORT 

 

*    *    *    *    *    *    *    * 

 FINAL DIAGNOSIS:

Lung tissue, left lung CT-guided biopsy:

- SQUAMOUS CELL CARCINOMA, MODERATELY DIFFERENTIATED. SEE COMMENT.

- Focal acute and organizing pneumonia.

 

COMMENT:

Sections of the left lung mass CT-guided biopsy show extensive

replacement of lung parenchyma by a malignant epithelial neoplasm. 

The malignant cells are present in irregular solid nests within an

inflamed reactive desmoplastic stroma.  The malignant cells have a

polygonal, squamoid appearance with well demarcated cell borders. 

The malignant cells have ample amounts of pale eosinophilic

cytoplasm, and possess enlarged, moderately pleomorphic rounded to

ovoid hyperchromatic nuclei containing prominent nucleoli.  Tumor

cells focally have a more densely eosinophilic keratinized cytoplasm,

and there are focal dyskeratotic cells.  Mitotic figures are present.

 There is also a segment of lung parenchyma showing acute and

organizing pneumonia.  The morphologic findings are supportive of the

diagnosis of a moderately differentiated squamous cell carcinoma. 

The case is also examined by Dr. Ian Gross, who concurs with the

diagnosis.

(JPM:; d/t: 17) 

 

 

REPORT ELECTRONICALLY SIGNED BY:   Suresh Rios M.D.

DATE/TIME:   2017 13:09

*    *    *    *    *    *    *    *

 

GROSS PATHOLOGY:

The specimen is received in formalin, labeled "Joey Garrett and

left lung bx."  Received is a 0.8 x 0.7 x 0.3 cm aggregate of

blood-tinged, white-tan, rubbery, and irregular soft tissue

fragments.  The specimen is entirely submitted in cassette A1.

(TTL; 2017)

 

 

 INITIAL CPT CODE(S):

A; 14334

Professional services performed by [facility] at 

[address] [address]

 

  Technical services performed by [facility] at [address] [place] [address], [facility]

[address] [address].

  

 SPECIMEN(S) RECEIVED:

A.Parahilar mass

 

 CLINICAL HISTORY:

Left hilar/parahilar mass, weigh tloss, previous smoker, abnormal CT,

bronch ca

 

 PATIENT:  JOEY GARRETT

/AGE:  1935 (Age: 81)  

PATIENT #:  [id number]

ACCESSION #:  [id number]          ALT CASE #:   

SPECIMEN COLLECTION DATE:  2017

SPECIMEN RECEIVED DATE:  2017

   

[facility] - [id number] [address] - PHONE: 

[phone]

* * *  END OF REPORT  * * *

## 2017-02-24 NOTE — PDOC
Subjective:


Subjective:


Onc f/u- LLL mass, probable malignancy





Pt had bronch and CT guided bx yesterday, now with pneumothorax. SOB is not 

significant for him now. No CP, cough.





Objective:


Vital Signs:





 Vital Signs








  Date Time  Temp Pulse Resp B/P Pulse Ox O2 Delivery O2 Flow Rate FiO2


 


2/24/17 10:00   14  93 Nasal Cannula 4.0 


 


2/24/17 09:56  92      


 


2/24/17 07:15 99.1   121/59    





 99.1       











Physical Exam:


Heart:  Regular rate


General:  Alert, Oriented X3, Cooperative, No acute distress


Lungs:  Other (Decreased breath sounds on left lung, right lung clear)


Psych/Mental Status:  Mental status NL, Mood NL


Skin:  No significant lesion





Labs/Imaging:


CT A/P neg


Bronch- no endobronchial lesions


CXR today- Moderate left PTX


CT guided bx done yesterday





Assessment/Plan


A/P:


1. 4 cm LLL mass concerning for early stage malignancy, no adenopathy or 

metastatic disease noted with noncontrast CT C/A/P/head. S/p bx 2/23. Path 

pending.


2. CKD


3. H/o Tob abuse


4. Left pneumothorax, chest tube to be place today.





Dr. Winn is covering this weekend if urgent issues arise. I will return 

Monday. We discussed that it will likely be mid/ late next week until we know 

results so I will plan to f/u at that time, likely in clinic as outpt.





Called and left message for his daughter Lizabeth ([phone]) per his request 

as well.








JEVON PEREZ DO Feb 24, 2017 10:25

## 2017-02-24 NOTE — PDOC
PULMONARY PROGRESS NOTES


Subjective


developed PTX post lung biopsy


s/p left chest tube


Vitals





 Vital Signs








  Date Time  Temp Pulse Resp B/P Pulse Ox O2 Delivery O2 Flow Rate FiO2


 


2/24/17 11:55  78  153/81    


 


2/24/17 10:26   18  98 Nasal Cannula 2.0 


 


2/24/17 07:15 99.1       





 99.1       








General:  Alert, No acute distress


Lungs:  Other (decrease bs)


Cardiovascular:  S1


Abdomen:  Soft


Neuro Exam:  Alert


Extremities:  No Edema


Skin:  Warm


Labs





Laboratory Tests








Test


  2/22/17


15:15 2/22/17


16:59 2/22/17


20:48 2/23/17


03:40


 


Prothrombin Time


  13.4SEC


(11.7-14.0) 


  


  


 


 


Prothromb Time International


Ratio 1.1 (0.8-1.1) 


  


  


  


 


 


Glucose (Fingerstick)


  


  185mg/dL


(70-99) 174mg/dL


(70-99) 


 


 


Sodium Level


  


  


  


  140mmol/L


(136-145)


 


Potassium Level


  


  


  


  4.2mmol/L


(3.5-5.1)


 


Chloride Level


  


  


  


  106mmol/L


()


 


Carbon Dioxide Level


  


  


  


  22mmol/L


(21-32)


 


Anion Gap    12 (6-14) 


 


Blood Urea Nitrogen    20mg/dL (8-26) 


 


Creatinine


  


  


  


  1.4mg/dL


(0.7-1.3)


 


Estimated GFR


(Cockcroft-Gault) 


  


  


  59.2 


 


 


Glucose Level


  


  


  


  170mg/dL


(70-99)


 


Calcium Level


  


  


  


  9.0mg/dL


(8.5-10.1)


 


Phosphorus Level


  


  


  


  3.5mg/dL


(2.6-4.7)


 


Magnesium Level


  


  


  


  1.7mg/dL


(1.8-2.4)


 


Albumin


  


  


  


  2.7g/dL


(3.4-5.0)














Test


  2/23/17


07:52 2/23/17


11:05 2/23/17


16:23 2/23/17


20:46


 


Glucose (Fingerstick)


  196mg/dL


(70-99) 239mg/dL


(70-99) 236mg/dL


(70-99) 233mg/dL


(70-99)














Test


  2/24/17


06:20 2/24/17


07:15 2/24/17


11:12 


 


 


White Blood Count


  4.5x10^3/uL


(4.0-11.0) 


  


  


 


 


Red Blood Count


  4.16x10^6/uL


(4.30-5.70) 


  


  


 


 


Hemoglobin


  11.4g/dL


(13.0-17.5) 


  


  


 


 


Hematocrit


  34.8%


(39.0-53.0) 


  


  


 


 


Mean Corpuscular Volume 84fL ()    


 


Mean Corpuscular Hemoglobin 28pg (25-35)    


 


Mean Corpuscular Hemoglobin


Concent 33g/dL (31-37) 


  


  


  


 


 


Red Cell Distribution Width


  14.3%


(11.5-14.5) 


  


  


 


 


Platelet Count


  103x10^3/uL


(140-400) 


  


  


 


 


Neutrophils (%) (Auto) 68% (31-73)    


 


Lymphocytes (%) (Auto) 23% (24-48)    


 


Monocytes (%) (Auto) 7% (0-9)    


 


Eosinophils (%) (Auto) 2% (0-3)    


 


Basophils (%) (Auto) 0% (0-3)    


 


Neutrophils # (Auto)


  3.1x10^3uL


(1.8-7.7) 


  


  


 


 


Lymphocytes # (Auto)


  1.1x10^3/uL


(1.0-4.8) 


  


  


 


 


Monocytes # (Auto)


  0.3x10^3/uL


(0.0-1.1) 


  


  


 


 


Eosinophils # (Auto)


  0.1x10^3/uL


(0.0-0.7) 


  


  


 


 


Basophils # (Auto)


  0.0x10^3/uL


(0.0-0.2) 


  


  


 


 


Sodium Level


  139mmol/L


(136-145) 


  


  


 


 


Potassium Level


  4.7mmol/L


(3.5-5.1) 


  


  


 


 


Chloride Level


  106mmol/L


() 


  


  


 


 


Carbon Dioxide Level


  25mmol/L


(21-32) 


  


  


 


 


Anion Gap 8 (6-14)    


 


Blood Urea Nitrogen 12mg/dL (8-26)    


 


Creatinine


  1.3mg/dL


(0.7-1.3) 


  


  


 


 


Estimated GFR


(Cockcroft-Gault) 64.4 


  


  


  


 


 


Glucose Level


  149mg/dL


(70-99) 


  


  


 


 


Calcium Level


  9.0mg/dL


(8.5-10.1) 


  


  


 


 


Phosphorus Level


  3.2mg/dL


(2.6-4.7) 


  


  


 


 


Magnesium Level


  1.8mg/dL


(1.8-2.4) 


  


  


 


 


Albumin


  2.7g/dL


(3.4-5.0) 


  


  


 


 


Glucose (Fingerstick)


  


  145mg/dL


(70-99) 135mg/dL


(70-99) 


 








Laboratory Tests








Test


  2/23/17


16:23 2/23/17


20:46 2/24/17


06:20 2/24/17


07:15


 


Glucose (Fingerstick)


  236mg/dL


(70-99) 233mg/dL


(70-99) 


  145mg/dL


(70-99)


 


White Blood Count


  


  


  4.5x10^3/uL


(4.0-11.0) 


 


 


Red Blood Count


  


  


  4.16x10^6/uL


(4.30-5.70) 


 


 


Hemoglobin


  


  


  11.4g/dL


(13.0-17.5) 


 


 


Hematocrit


  


  


  34.8%


(39.0-53.0) 


 


 


Mean Corpuscular Volume   84fL ()  


 


Mean Corpuscular Hemoglobin   28pg (25-35)  


 


Mean Corpuscular Hemoglobin


Concent 


  


  33g/dL (31-37) 


  


 


 


Red Cell Distribution Width


  


  


  14.3%


(11.5-14.5) 


 


 


Platelet Count


  


  


  103x10^3/uL


(140-400) 


 


 


Neutrophils (%) (Auto)   68% (31-73)  


 


Lymphocytes (%) (Auto)   23% (24-48)  


 


Monocytes (%) (Auto)   7% (0-9)  


 


Eosinophils (%) (Auto)   2% (0-3)  


 


Basophils (%) (Auto)   0% (0-3)  


 


Neutrophils # (Auto)


  


  


  3.1x10^3uL


(1.8-7.7) 


 


 


Lymphocytes # (Auto)


  


  


  1.1x10^3/uL


(1.0-4.8) 


 


 


Monocytes # (Auto)


  


  


  0.3x10^3/uL


(0.0-1.1) 


 


 


Eosinophils # (Auto)


  


  


  0.1x10^3/uL


(0.0-0.7) 


 


 


Basophils # (Auto)


  


  


  0.0x10^3/uL


(0.0-0.2) 


 


 


Sodium Level


  


  


  139mmol/L


(136-145) 


 


 


Potassium Level


  


  


  4.7mmol/L


(3.5-5.1) 


 


 


Chloride Level


  


  


  106mmol/L


() 


 


 


Carbon Dioxide Level


  


  


  25mmol/L


(21-32) 


 


 


Anion Gap   8 (6-14)  


 


Blood Urea Nitrogen   12mg/dL (8-26)  


 


Creatinine


  


  


  1.3mg/dL


(0.7-1.3) 


 


 


Estimated GFR


(Cockcroft-Gault) 


  


  64.4 


  


 


 


Glucose Level


  


  


  149mg/dL


(70-99) 


 


 


Calcium Level


  


  


  9.0mg/dL


(8.5-10.1) 


 


 


Phosphorus Level


  


  


  3.2mg/dL


(2.6-4.7) 


 


 


Magnesium Level


  


  


  1.8mg/dL


(1.8-2.4) 


 


 


Albumin


  


  


  2.7g/dL


(3.4-5.0) 


 














Test


  2/24/17


11:12 


  


  


 


 


Glucose (Fingerstick)


  135mg/dL


(70-99) 


  


  


 








Medications





Active Scripts








 Medications  Dose


 Route/Sig Days Date Category


 


 Cyclobenzaprine


 Hcl 10 Mg Tablet  


    2/21/17 Reported


 


 Omeprazole 20 Mg


 Capsule.dr  


    2/21/17 Reported


 


 Celecoxib 200 Mg


 Capsule  


    2/21/17 Reported


 


 Hydrocodone-Apap


 5-300


  (Hydrocodone


 Bit/Acetaminophen)


 1 Each Tablet  


    2/21/17 Reported


 


 Losartan-Hctz


 50-12.5 Mg Tab


  (Losartan/Hydrochlorothiazide)


 1 Each Tablet  


    2/21/17 Reported


 


 Novolog Flexpen


  (Insulin Aspart)


 100 Unit/1 Ml


 Insuln.pen  15 Unit


 SQ 1X   2/21/17 Reported











Impression


.


1.  A 4.4 cm left lower lobe mass in the patient, who is an ex-smoker.  He


smoked for 30 years.  He has lost about 20 pounds in the last 2 weeks.  The CT


chest findings are highly concerning for a bronchogenic cancer. s/p non-

diagnostic


 bronchoscopy. followed bt ct guided left biopsy 2/23


2.  Suspected underlying chronic obstructive pulmonary disease.


3.  Hyperglycemia, present on admission, currently improved.


4.  Acute renal failure present on admission, which is improved with hydration.


5.  Moderate protein-calorie malnutrition.


6.  PTX post biopsy, resolved with chest tube





Plan


.





1.  Biopsy c/w squamous cell cancer


2.  will need staging PET as OP/ may be surgical


3.  We will also obtain full PFTs. once chest tube is out


4.  f/u CXR daily


5.  Continue with present bronchodilators.


6.  d/w CARLITOS Huston MD Feb 24, 2017 12:57

## 2017-02-24 NOTE — PDOC
PROGRESS NOTES


Chief Complaint


Chief Complaint


1. Acute Renal Failure


2. Vasomotor nephropathy


3. Diabetes Mellitus, new diagnosis


4. HONK


5. Globus sensation


6. Pulmonary nodule








History of Present Illness


History of Present Illness


Pt laying down in bed awake, and alert when seen and examined this AM. Pt 

denies any CP or SOA.  Small pneumothorax noted, pt to have chest tube 

inserted.  


  


VSS- All questions and concerns addressed and answered.





Vitals


Vitals





 Vital Signs








  Date Time  Temp Pulse Resp B/P Pulse Ox O2 Delivery O2 Flow Rate FiO2


 


2/24/17 11:10  73  145/86    


 


2/24/17 10:26   18  98 Nasal Cannula 2.0 


 


2/24/17 07:15 99.1       





 99.1       











Physical Exam


General:  Alert, Oriented X3, Cooperative, No acute distress


Heart:  Regular rate, Normal S1, Normal S2


Lungs:  Clear


Abdomen:  Normal bowel sounds, Soft, No tenderness, No hepatosplenomegaly


Extremities:  No clubbing, No cyanosis, No tenderness/swelling


Skin:  No rashes, No breakdown, No significant lesion





Labs


LABS





Laboratory Tests








Test


  2/23/17


16:23 2/23/17


20:46 2/24/17


06:20 2/24/17


07:15


 


Glucose (Fingerstick)


  236mg/dL


(70-99) 233mg/dL


(70-99) 


  145mg/dL


(70-99)


 


White Blood Count


  


  


  4.5x10^3/uL


(4.0-11.0) 


 


 


Red Blood Count


  


  


  4.16x10^6/uL


(4.30-5.70) 


 


 


Hemoglobin


  


  


  11.4g/dL


(13.0-17.5) 


 


 


Hematocrit


  


  


  34.8%


(39.0-53.0) 


 


 


Mean Corpuscular Volume   84fL ()  


 


Mean Corpuscular Hemoglobin   28pg (25-35)  


 


Mean Corpuscular Hemoglobin


Concent 


  


  33g/dL (31-37) 


  


 


 


Red Cell Distribution Width


  


  


  14.3%


(11.5-14.5) 


 


 


Platelet Count


  


  


  103x10^3/uL


(140-400) 


 


 


Neutrophils (%) (Auto)   68% (31-73)  


 


Lymphocytes (%) (Auto)   23% (24-48)  


 


Monocytes (%) (Auto)   7% (0-9)  


 


Eosinophils (%) (Auto)   2% (0-3)  


 


Basophils (%) (Auto)   0% (0-3)  


 


Neutrophils # (Auto)


  


  


  3.1x10^3uL


(1.8-7.7) 


 


 


Lymphocytes # (Auto)


  


  


  1.1x10^3/uL


(1.0-4.8) 


 


 


Monocytes # (Auto)


  


  


  0.3x10^3/uL


(0.0-1.1) 


 


 


Eosinophils # (Auto)


  


  


  0.1x10^3/uL


(0.0-0.7) 


 


 


Basophils # (Auto)


  


  


  0.0x10^3/uL


(0.0-0.2) 


 


 


Sodium Level


  


  


  139mmol/L


(136-145) 


 


 


Potassium Level


  


  


  4.7mmol/L


(3.5-5.1) 


 


 


Chloride Level


  


  


  106mmol/L


() 


 


 


Carbon Dioxide Level


  


  


  25mmol/L


(21-32) 


 


 


Anion Gap   8 (6-14)  


 


Blood Urea Nitrogen   12mg/dL (8-26)  


 


Creatinine


  


  


  1.3mg/dL


(0.7-1.3) 


 


 


Estimated GFR


(Cockcroft-Gault) 


  


  64.4 


  


 


 


Glucose Level


  


  


  149mg/dL


(70-99) 


 


 


Calcium Level


  


  


  9.0mg/dL


(8.5-10.1) 


 


 


Phosphorus Level


  


  


  3.2mg/dL


(2.6-4.7) 


 


 


Magnesium Level


  


  


  1.8mg/dL


(1.8-2.4) 


 


 


Albumin


  


  


  2.7g/dL


(3.4-5.0) 


 














Test


  2/24/17


11:12 


  


  


 


 


Glucose (Fingerstick)


  135mg/dL


(70-99) 


  


  


 











Review of Systems


Review of Systems


Patient complaint of hunger


Patient complaint of fatigue





Assessment and Plan


Assessmemt and Plan


 Problems


Medical Problems:


(1) ARF (acute renal failure)


Status: Acute  





(2) Dysphagia


Status: Acute  





(3) Dysphagia


Status: Acute  





(4) Hyperglycemia


Status: Acute  





Assessment:





1. Acute Renal Failure


2. Vasomotor nephropathy


3. Diabetes Mellitus, new diagnosis


4. HONK


5. Globus sensation


6. Pulmonary nodule








Plan:





Continue to monitor the patient per floor protocol


Continue daily labs- CBC, BMP, BUN, and Cr


Daily PTOT


Continue IVFs at 100cc/hr





Appreciate all subspecialty input and recommendations


Await pathology results of lung biopsy


Chest tube inserted-appreciate Pulm recommendations and management





DW RN


Problems:  





Comment


Review of Relevant


I have reviewed the following items emelia (where applicable) has been applied.


Labs





Laboratory Tests








Test


  2/22/17


15:15 2/22/17


16:59 2/22/17


20:48 2/23/17


03:40


 


Prothrombin Time


  13.4SEC


(11.7-14.0) 


  


  


 


 


Prothromb Time International


Ratio 1.1 (0.8-1.1) 


  


  


  


 


 


Glucose (Fingerstick)


  


  185mg/dL


(70-99) 174mg/dL


(70-99) 


 


 


Sodium Level


  


  


  


  140mmol/L


(136-145)


 


Potassium Level


  


  


  


  4.2mmol/L


(3.5-5.1)


 


Chloride Level


  


  


  


  106mmol/L


()


 


Carbon Dioxide Level


  


  


  


  22mmol/L


(21-32)


 


Anion Gap    12 (6-14) 


 


Blood Urea Nitrogen    20mg/dL (8-26) 


 


Creatinine


  


  


  


  1.4mg/dL


(0.7-1.3)


 


Estimated GFR


(Cockcroft-Gault) 


  


  


  59.2 


 


 


Glucose Level


  


  


  


  170mg/dL


(70-99)


 


Calcium Level


  


  


  


  9.0mg/dL


(8.5-10.1)


 


Phosphorus Level


  


  


  


  3.5mg/dL


(2.6-4.7)


 


Magnesium Level


  


  


  


  1.7mg/dL


(1.8-2.4)


 


Albumin


  


  


  


  2.7g/dL


(3.4-5.0)














Test


  2/23/17


07:52 2/23/17


11:05 2/23/17


16:23 2/23/17


20:46


 


Glucose (Fingerstick)


  196mg/dL


(70-99) 239mg/dL


(70-99) 236mg/dL


(70-99) 233mg/dL


(70-99)














Test


  2/24/17


06:20 2/24/17


07:15 2/24/17


11:12 


 


 


White Blood Count


  4.5x10^3/uL


(4.0-11.0) 


  


  


 


 


Red Blood Count


  4.16x10^6/uL


(4.30-5.70) 


  


  


 


 


Hemoglobin


  11.4g/dL


(13.0-17.5) 


  


  


 


 


Hematocrit


  34.8%


(39.0-53.0) 


  


  


 


 


Mean Corpuscular Volume 84fL ()    


 


Mean Corpuscular Hemoglobin 28pg (25-35)    


 


Mean Corpuscular Hemoglobin


Concent 33g/dL (31-37) 


  


  


  


 


 


Red Cell Distribution Width


  14.3%


(11.5-14.5) 


  


  


 


 


Platelet Count


  103x10^3/uL


(140-400) 


  


  


 


 


Neutrophils (%) (Auto) 68% (31-73)    


 


Lymphocytes (%) (Auto) 23% (24-48)    


 


Monocytes (%) (Auto) 7% (0-9)    


 


Eosinophils (%) (Auto) 2% (0-3)    


 


Basophils (%) (Auto) 0% (0-3)    


 


Neutrophils # (Auto)


  3.1x10^3uL


(1.8-7.7) 


  


  


 


 


Lymphocytes # (Auto)


  1.1x10^3/uL


(1.0-4.8) 


  


  


 


 


Monocytes # (Auto)


  0.3x10^3/uL


(0.0-1.1) 


  


  


 


 


Eosinophils # (Auto)


  0.1x10^3/uL


(0.0-0.7) 


  


  


 


 


Basophils # (Auto)


  0.0x10^3/uL


(0.0-0.2) 


  


  


 


 


Sodium Level


  139mmol/L


(136-145) 


  


  


 


 


Potassium Level


  4.7mmol/L


(3.5-5.1) 


  


  


 


 


Chloride Level


  106mmol/L


() 


  


  


 


 


Carbon Dioxide Level


  25mmol/L


(21-32) 


  


  


 


 


Anion Gap 8 (6-14)    


 


Blood Urea Nitrogen 12mg/dL (8-26)    


 


Creatinine


  1.3mg/dL


(0.7-1.3) 


  


  


 


 


Estimated GFR


(Cockcroft-Gault) 64.4 


  


  


  


 


 


Glucose Level


  149mg/dL


(70-99) 


  


  


 


 


Calcium Level


  9.0mg/dL


(8.5-10.1) 


  


  


 


 


Phosphorus Level


  3.2mg/dL


(2.6-4.7) 


  


  


 


 


Magnesium Level


  1.8mg/dL


(1.8-2.4) 


  


  


 


 


Albumin


  2.7g/dL


(3.4-5.0) 


  


  


 


 


Glucose (Fingerstick)


  


  145mg/dL


(70-99) 135mg/dL


(70-99) 


 








Laboratory Tests








Test


  2/23/17


16:23 2/23/17


20:46 2/24/17


06:20 2/24/17


07:15


 


Glucose (Fingerstick)


  236mg/dL


(70-99) 233mg/dL


(70-99) 


  145mg/dL


(70-99)


 


White Blood Count


  


  


  4.5x10^3/uL


(4.0-11.0) 


 


 


Red Blood Count


  


  


  4.16x10^6/uL


(4.30-5.70) 


 


 


Hemoglobin


  


  


  11.4g/dL


(13.0-17.5) 


 


 


Hematocrit


  


  


  34.8%


(39.0-53.0) 


 


 


Mean Corpuscular Volume   84fL ()  


 


Mean Corpuscular Hemoglobin   28pg (25-35)  


 


Mean Corpuscular Hemoglobin


Concent 


  


  33g/dL (31-37) 


  


 


 


Red Cell Distribution Width


  


  


  14.3%


(11.5-14.5) 


 


 


Platelet Count


  


  


  103x10^3/uL


(140-400) 


 


 


Neutrophils (%) (Auto)   68% (31-73)  


 


Lymphocytes (%) (Auto)   23% (24-48)  


 


Monocytes (%) (Auto)   7% (0-9)  


 


Eosinophils (%) (Auto)   2% (0-3)  


 


Basophils (%) (Auto)   0% (0-3)  


 


Neutrophils # (Auto)


  


  


  3.1x10^3uL


(1.8-7.7) 


 


 


Lymphocytes # (Auto)


  


  


  1.1x10^3/uL


(1.0-4.8) 


 


 


Monocytes # (Auto)


  


  


  0.3x10^3/uL


(0.0-1.1) 


 


 


Eosinophils # (Auto)


  


  


  0.1x10^3/uL


(0.0-0.7) 


 


 


Basophils # (Auto)


  


  


  0.0x10^3/uL


(0.0-0.2) 


 


 


Sodium Level


  


  


  139mmol/L


(136-145) 


 


 


Potassium Level


  


  


  4.7mmol/L


(3.5-5.1) 


 


 


Chloride Level


  


  


  106mmol/L


() 


 


 


Carbon Dioxide Level


  


  


  25mmol/L


(21-32) 


 


 


Anion Gap   8 (6-14)  


 


Blood Urea Nitrogen   12mg/dL (8-26)  


 


Creatinine


  


  


  1.3mg/dL


(0.7-1.3) 


 


 


Estimated GFR


(Cockcroft-Gault) 


  


  64.4 


  


 


 


Glucose Level


  


  


  149mg/dL


(70-99) 


 


 


Calcium Level


  


  


  9.0mg/dL


(8.5-10.1) 


 


 


Phosphorus Level


  


  


  3.2mg/dL


(2.6-4.7) 


 


 


Magnesium Level


  


  


  1.8mg/dL


(1.8-2.4) 


 


 


Albumin


  


  


  2.7g/dL


(3.4-5.0) 


 














Test


  2/24/17


11:12 


  


  


 


 


Glucose (Fingerstick)


  135mg/dL


(70-99) 


  


  


 








Microbiology


2/23/17 Gram Stain - Final, Complete


          


2/21/17 Urine Culture - Final, Complete


          


2/21/17 Urine Culture Result 1 (MARILU) - Final, Complete


Medications





 Current Medications


Sodium Chloride 500 ml @  500 mls/hr 1X  ONCE IV  Last administered on 2/20/ 17at 15:30;  Start 2/20/17 at 15:30;  Stop 2/20/17 at 16:29;  Status DC


Sodium Chloride (Iv Sodium Chloride 0.9% 1000ml Bag) 1,000 ml @  510 mls/hr 

Q1H58M IV ;  Start 2/20/17 at 17:15;  Stop 2/20/17 at 21:15;  Status DC


Ondansetron HCl 4 mg 4 mg PRN Q8HRS  PRN IV NAUSEA/VOMITING;  Start 2/20/17 at 

17:15;  Stop 2/21/17 at 17:14;  Status DC


Sodium Chloride (Iv Sodium Chloride 0.9% 1000ml Bag) 1,000 ml @  125 mls/hr Q8H 

IV  Last administered on 2/21/17at 12:26;  Start 2/20/17 at 17:04;  Stop 2/21/ 17 at 17:03;  Status DC


Acetaminophen (Tylenol) 650 mg PRN Q4HRS  PRN PO FEVER;  Start 2/20/17 at 17:15

;  Stop 2/21/17 at 17:14;  Status DC


Insulin Aspart (Novolog) 0-9 UNITS TIDWMEALS SQ  Last administered on 2/23/17at 

17:56;  Start 2/21/17 at 08:00


Dextrose 12.5 gm PRN Q15MIN  PRN IV SEE COMMENTS;  Start 2/20/17 at 21:45


Insulin Aspart (Novolog) 5 units TIDAC SQ ;  Start 2/21/17 at 07:30;  Stop 2/21/ 17 at 07:30;  Status DC


Insulin Detemir (Levemir) 10 units QHS SQ  Last administered on 2/20/17at 22:37

;  Start 2/20/17 at 22:00;  Stop 2/21/17 at 00:57;  Status DC


Insulin Aspart (Novolog) 10 units 1X  ONCE SQ  Last administered on 2/20/17at 22

:36;  Start 2/20/17 at 22:30;  Stop 2/20/17 at 22:31;  Status DC


Potassium Chloride (Klor-Con) 20 meq DAILYWBKFT PO  Last administered on 2/22/ 17at 09:26;  Start 2/21/17 at 08:00;  Stop 2/22/17 at 12:34;  Status DC


Enoxaparin Sodium (Lovenox Per Pharmacy Prophylaxis Dosing) 1 each PRN DAILY  

PRN MC SEE COMMENTS;  Start 2/20/17 at 22:00;  Stop 2/22/17 at 14:59;  Status DC


Enoxaparin Sodium (Lovenox 30mg Syringe) 30 mg QHS SQ  Last administered on 2/21 /17at 21:58;  Start 2/20/17 at 23:00;  Stop 2/22/17 at 14:59;  Status DC


Insulin Aspart (Novolog) 15 units 1X  ONCE SQ  Last administered on 2/21/17at 00

:11;  Start 2/21/17 at 00:30;  Stop 2/21/17 at 00:31;  Status DC


Insulin Aspart (Novolog) 15 units TIDAC SQ ;  Start 2/21/17 at 07:30;  Stop 2/21 /17 at 09:36;  Status DC


Insulin Detemir (Levemir) 25 units QHS SQ ;  Start 2/21/17 at 21:00;  Stop 2/21/ 17 at 21:00;  Status DC


Insulin Aspart (Novolog) 17 units 1X  ONCE SQ  Last administered on 2/21/17at 01

:28;  Start 2/21/17 at 01:15;  Stop 2/21/17 at 01:16;  Status DC


Info (Do NOT chart on this placeholder) 0.5 each 1X  ONCE MC ;  Start 2/21/17 

at 02:45;  Stop 2/21/17 at 02:46;  Status UNV


Pneumococcal Polyvalent Vaccine (Do NOT chart on this placeholder) 0.5 each 1X  

ONCE MC ;  Start 2/21/17 at 02:45;  Stop 2/21/17 at 02:46;  Status UNV


Influenza Virus Vaccine Quadrival (Fluarix Quad [id number] Syringe) 0.5 ml ONCE 

ONCE VAX IM  Last administered on 2/21/17at 09:32;  Start 2/21/17 at 09:00;  

Stop 2/21/17 at 09:04;  Status DC


Pneumococcal Polyvalent Vaccine (Pneumovax 23) 0.5 ml ONCE ONCE VAX IM  Last 

administered on 2/21/17at 09:31;  Start 2/21/17 at 09:00;  Stop 2/21/17 at 09:04

;  Status DC


Celecoxib (Celebrex) 200 mg PRN BID  PRN PO PAIN;  Start 2/21/17 at 09:45;  

Stop 2/22/17 at 12:34;  Status DC


Cyclobenzaprine HCl (Flexeril) 10 mg PRN TID  PRN PO SPASMS;  Start 2/21/17 at 

09:45


Acetaminophen/ Hydrocodone Bitart (Lortab 5/325) 1 tab PRN Q4HRS  PRN PO 

MODERATE PAIN;  Start 2/21/17 at 09:45


Losartan Potassium (Cozaar) 50 mg DAILY PO ;  Start 2/22/17 at 09:00;  Stop 2/22 /17 at 09:00;  Status DC


Hydrochlorothiazide 12.5 mg 12.5 mg DAILY PO ;  Start 2/22/17 at 09:00;  Stop 2/ 22/17 at 09:00;  Status DC


Magnesium Sulfate/ Dextrose (Magnesium Sulfate PREMIX 2GM) 50 ml @ 25 mls/hr 

PRN DAILY  PRN IV for Mag < 1.7 on am labs;  Start 2/21/17 at 11:00


Glyburide (Diabeta) 5 mg BIDWMEALS PO  Last administered on 2/23/17at 17:49;  

Start 2/21/17 at 17:00


Insulin Detemir 15 units 15 units DAILY08 SQ  Last administered on 2/23/17at 17:

57;  Start 2/22/17 at 12:30


Magnesium Sulfate/ Dextrose (Magnesium Sulfate PREMIX 2GM) 50 ml @ 25 mls/hr 1X

  ONCE IV ;  Start 2/22/17 at 12:30;  Stop 2/22/17 at 14:29;  Status DC


Insulin Aspart 8 units 8 units TIDAC SQ  Last administered on 2/23/17at 17:56;  

Start 2/22/17 at 12:00


Magnesium Sulfate/ Dextrose 100 ml @  17 mls/hr 1X  ONCE IV  Last administered 

on 2/22/17at 14:19;  Start 2/22/17 at 13:00;  Stop 2/22/17 at 18:52;  Status DC


Sodium Phosphate/ Dextrose 263.3333 ml @  64.167 m... 1X  ONCE IV  Last 

administered on 2/22/17at 14:20;  Start 2/22/17 at 13:00;  Stop 2/22/17 at 17:06

;  Status DC


Tamsulosin HCl (Flomax) 0.4 mg QHS PO  Last administered on 2/23/17at 20:20;  

Start 2/22/17 at 21:00


Ondansetron HCl (Zofran) 4 mg PRN Q6HRS  PRN IV Nausea;  Start 2/23/17 at 07:00

;  Stop 2/23/17 at 18:00;  Status DC


Fentanyl Citrate (Fentanyl 2ml Vial) 25 mcg PRN Q5MIN  PRN IV MILD PAIN;  Start 

2/23/17 at 07:00;  Stop 2/23/17 at 18:00;  Status DC


Fentanyl Citrate (Fentanyl 2ml Vial) 50 mcg PRN Q5MIN  PRN IV MODERATE PAIN;  

Start 2/23/17 at 07:00;  Stop 2/23/17 at 18:00;  Status DC


Morphine Sulfate 1 mg 1 mg PRN Q10MIN  PRN IV SEVERE PAIN;  Start 2/23/17 at 07:

00;  Stop 2/23/17 at 18:00;  Status DC


Lactated Ringer's (Iv Lactated Ringers) 1,000 ml @  30 mls/hr Q24H IV ;  Start 2 /23/17 at 07:00;  Stop 2/23/17 at 18:59;  Status DC


Lidocaine HCl 2 ml 1X PRN  PRN ID IV START;  Start 2/23/17 at 07:00;  Stop 2/23/ 17 at 18:00;  Status DC


Hydromorphone HCl (Dilaudid) 0.5 mg PRN Q10MIN  PRN IV SEVERE PAIN, Second 

choice;  Start 2/23/17 at 07:00;  Stop 2/23/17 at 18:00;  Status DC


Prochlorperazine Edisylate (Compazine) 5 mg PACU PRN  PRN IV NAUSEA;  Start 2/23 /17 at 07:00;  Stop 2/23/17 at 18:00;  Status DC


Albuterol Sulfate 2.5 mg 2.5 mg STK-MED ONCE .ROUTE ;  Start 2/23/17 at 09:09;  

Stop 2/23/17 at 09:10;  Status DC


Propofol (Diprivan) 20 ml @ As Directed STK-MED ONCE IV ;  Start 2/23/17 at 09:

35;  Stop 2/23/17 at 09:36;  Status DC


Albuterol Sulfate (Ventolin Neb Soln) 2.5 mg 1X  ONCE NEB  Last administered on 

2/23/17at 09:10;  Start 2/23/17 at 09:10;  Stop 2/23/17 at 10:30;  Status DC


Lidocaine/Sodium Bicarbonate (Buffered Lidocaine 1%) 20 ml STK-MED ONCE IJ ;  

Start 2/23/17 at 12:07;  Stop 2/23/17 at 12:08;  Status DC


Iohexol (Omnipaque 300 Mg/ml) 50 ml STK-MED ONCE .ROUTE ;  Start 2/23/17 at 12:

12;  Stop 2/23/17 at 12:13;  Status DC


Iohexol (Omnipaque 300 Mg/ml) 75 ml STK-MED ONCE .ROUTE ;  Start 2/23/17 at 12:

12;  Stop 2/23/17 at 12:13;  Status DC


Naloxone HCl (Narcan) 0.4 mg STK-MED ONCE .ROUTE ;  Start 2/23/17 at 12:18;  

Stop 2/23/17 at 12:19;  Status DC


Flumazenil (Romazicon) 0.5 mg STK-MED ONCE IV ;  Start 2/23/17 at 12:19;  Stop 2 /23/17 at 12:20;  Status DC


Midazolam HCl (Versed) 5 mg STK-MED ONCE .ROUTE ;  Start 2/23/17 at 12:19;  

Stop 2/23/17 at 12:20;  Status DC


Fentanyl Citrate 250 mcg 250 mcg STK-MED ONCE .ROUTE ;  Start 2/23/17 at 12:19;

  Stop 2/23/17 at 12:20;  Status DC


Sodium Chloride (Iv Sodium Chloride 0.9% 1000ml Bag) 1,000 ml @  100 mls/hr 

Q10H IV  Last administered on 2/24/17at 01:24;  Start 2/23/17 at 15:00;  Stop 2/ 24/17 at 10:59;  Status DC


Lidocaine/Sodium Bicarbonate (Buffered Lidocaine 1%) 20 ml 1X  ONCE IJ  Last 

administered on 2/23/17at 13:08;  Start 2/23/17 at 12:45;  Stop 2/23/17 at 12:46

;  Status DC


Midazolam HCl (Versed) 5 mg 1X  ONCE IV  Last administered on 2/23/17at 13:09;  

Start 2/23/17 at 12:45;  Stop 2/23/17 at 12:46;  Status DC


Fentanyl Citrate (Fentanyl 5ml Vial) 250 mcg 1X  ONCE IV  Last administered on 2 /23/17at 13:08;  Start 2/23/17 at 12:45;  Stop 2/23/17 at 12:46;  Status DC


Iohexol (Omnipaque 300 Mg/ml) 100 ml 1X  ONCE IART  Last administered on 2/23/ 17at 12:45;  Start 2/23/17 at 12:45;  Stop 2/23/17 at 12:46;  Status DC


Naloxone HCl (Narcan) 0.4 mg STK-MED ONCE .ROUTE ;  Start 2/24/17 at 09:27;  

Stop 2/24/17 at 09:28;  Status DC


Flumazenil (Romazicon) 0.5 mg STK-MED ONCE IV ;  Start 2/24/17 at 09:27;  Stop 2 /24/17 at 09:28;  Status DC


Fentanyl Citrate (Fentanyl 2ml Vial) 100 mcg STK-MED ONCE .ROUTE ;  Start 2/24/ 17 at 09:27;  Stop 2/24/17 at 09:28;  Status DC


Midazolam HCl (Versed) 2 mg STK-MED ONCE .ROUTE ;  Start 2/24/17 at 09:27;  

Stop 2/24/17 at 09:28;  Status DC


Lidocaine/Sodium Bicarbonate (Buffered Lidocaine 1%) 20 ml STK-MED ONCE IJ ;  

Start 2/24/17 at 09:37;  Stop 2/24/17 at 09:38;  Status DC


Lidocaine/Sodium Bicarbonate (Buffered Lidocaine 1%) 5 ml 1X  ONCE IJ  Last 

administered on 2/24/17at 10:00;  Start 2/24/17 at 10:00;  Stop 2/24/17 at 10:01

;  Status DC


Midazolam HCl (Versed) 1 mg 1X  ONCE IV  Last administered on 2/24/17at 10:00;  

Start 2/24/17 at 10:00;  Stop 2/24/17 at 10:01;  Status DC


Fentanyl Citrate (Fentanyl 2ml Vial) 50 mcg 1X  ONCE IV  Last administered on 2/ 24/17at 10:00;  Start 2/24/17 at 10:00;  Stop 2/24/17 at 10:01;  Status DC





Active Scripts


Active


Reported


Cyclobenzaprine Hcl 10 Mg Tablet   


Omeprazole 20 Mg Capsule.dr   


Celecoxib 200 Mg Capsule   


Hydrocodone-Apap 5-300 (Hydrocodone Bit/Acetaminophen) 1 Each Tablet   


Losartan-Hctz 50-12.5 Mg Tab (Losartan/Hydrochlorothiazide) 1 Each Tablet   


Novolog Flexpen (Insulin Aspart) 100 Unit/1 Ml Insuln.pen 15 Unit SQ 1X


Vitals/I & O





 Vital Sign - Last 24 Hours








 2/23/17 2/23/17 2/23/17 2/23/17





 12:34 12:41 12:52 12:56


 


Pulse 95 94 90 91


 


Resp 16 16 16 16


 


Pulse Ox 97 89 89 99


 


O2 Delivery Room Air Nasal Cannula Nasal Cannula Nasal Cannula


 


O2 Flow Rate  4.0 4.0 4.0





 2/23/17 2/23/17 2/23/17 2/23/17





 13:02 13:08 13:10 13:20


 


Pulse 91  91 93


 


Resp 16 16 16 20


 


B/P    128/86


 


Pulse Ox 99 97 97 


 


O2 Delivery Nasal Cannula Nasal Cannula Nasal Cannula Nasal Cannula


 


O2 Flow Rate 4.0 4.0 4.0 2.0





 2/23/17 2/23/17 2/23/17 2/23/17





 13:40 15:00 15:15 15:30


 


Temp   97.7 





   97.7 


 


Pulse 90 88 87 


 


Resp 20 18 18 


 


B/P 133/87 129/84 131/91 140/81


 


O2 Delivery Nasal Cannula  Nasal Cannula 


 


O2 Flow Rate 2.0  2.0 


 


    





    





 2/23/17 2/23/17 2/23/17 2/23/17





 16:00 16:30 17:00 18:00


 


Temp 98.0  97.6 98.1





 98.0  97.6 98.1


 


Pulse 80  84 84


 


Resp 18 20 18


 


B/P 130/76 133/78 139/80 138/81


 


Pulse Ox 98  97 


 


O2 Delivery Nasal Cannula  Nasal Cannula Nasal Cannula


 


O2 Flow Rate 2.0  2.0 


 


    





    





 2/23/17 2/23/17 2/23/17 2/23/17





 19:00 20:00 20:00 23:00


 


Temp 99.2 100.1  99.7





 99.2 100.1  99.7


 


Pulse 115 111  109


 


Resp 18 18  18


 


B/P 123/76 118/81  122/88


 


Pulse Ox 93 95  96


 


O2 Delivery Room Air Nasal Cannula Nasal Cannula Nasal Cannula


 


O2 Flow Rate  2.0 2.0 2.0


 


    





    





 2/24/17 2/24/17 2/24/17 2/24/17





 03:00 07:15 08:00 09:41


 


Temp 99.0 99.1  





 99.0 99.1  


 


Pulse 102 99  14


 


Resp 18 18  14


 


B/P 130/92 121/59  


 


Pulse Ox 96 93  92


 


O2 Delivery Nasal Cannula Room Air Room Air Room Air


 


O2 Flow Rate 2.0   


 


    





    





 2/24/17 2/24/17 2/24/17 2/24/17





 09:42 09:42 09:46 09:56


 


Pulse 14 14 14 92


 


Resp 14 14 14 14


 


Pulse Ox 92 92 92 92


 


O2 Delivery Nasal Cannula Nasal Cannula Nasal Cannula Nasal Cannula


 


O2 Flow Rate 4.0 4.0 4.0 4.0





 2/24/17 2/24/17 2/24/17 2/24/17





 10:00 10:26 10:40 10:55


 


Pulse  83 83 86


 


Resp 14 18  


 


B/P  145/94 141/87 139/86


 


Pulse Ox 93 98  


 


O2 Delivery Nasal Cannula Nasal Cannula  


 


O2 Flow Rate 4.0 2.0  





 2/24/17   





 11:10   


 


Pulse 73   


 


B/P 145/86   














 Intake and Output   


 


 2/23/17 2/23/17 2/24/17





 15:00 23:00 07:00


 


Intake Total  280 ml 


 


Output Total 250 ml 150 ml 500 ml


 


Balance -250 ml 130 ml -500 ml














MARCO FUNEZ K III DO Feb 24, 2017 12:18

## 2017-02-24 NOTE — RAD
EXAM: Chest one view.



HISTORY: Pneumothorax.



COMPARISON: 2/23/2017.



FINDINGS: A frontal view of the chest is obtained.    



The left pneumothorax has increased in size and is now moderate. Opacity at

the left hilum consistent with postbiopsy change has also increased mildly. 



There is mild atelectasis on the right. There is no clear pleural effusion.

The heart is not enlarged. There is a chronic left rib fracture.



IMPRESSION: 

1. The left pneumothorax has increased and is now moderate.



These findings were called to [facility] by Donn Patricio on 2/24/2017 at 0745.

## 2017-02-24 NOTE — PDOC
Provider Note


Provider Note


IR Note:





Post bx left Ptx enlarging.  


Dr Mena contacted.


Chest tube insertion requested--on IR schedule today.








AAKASH CHACON MD Feb 24, 2017 07:49

## 2017-02-25 VITALS — DIASTOLIC BLOOD PRESSURE: 82 MMHG | SYSTOLIC BLOOD PRESSURE: 136 MMHG

## 2017-02-25 VITALS — DIASTOLIC BLOOD PRESSURE: 82 MMHG | SYSTOLIC BLOOD PRESSURE: 115 MMHG

## 2017-02-25 VITALS — SYSTOLIC BLOOD PRESSURE: 89 MMHG | DIASTOLIC BLOOD PRESSURE: 57 MMHG

## 2017-02-25 VITALS — DIASTOLIC BLOOD PRESSURE: 68 MMHG | SYSTOLIC BLOOD PRESSURE: 109 MMHG

## 2017-02-25 VITALS — SYSTOLIC BLOOD PRESSURE: 126 MMHG | DIASTOLIC BLOOD PRESSURE: 75 MMHG

## 2017-02-25 VITALS — DIASTOLIC BLOOD PRESSURE: 77 MMHG | SYSTOLIC BLOOD PRESSURE: 115 MMHG

## 2017-02-25 LAB
ALBUMIN SERPL-MCNC: 2.4 G/DL (ref 3.4–5)
ANION GAP SERPL CALC-SCNC: 9 MMOL/L (ref 6–14)
BASOPHILS # BLD AUTO: 0 X10^3/UL (ref 0–0.2)
BASOPHILS NFR BLD: 1 % (ref 0–3)
BUN SERPL-MCNC: 14 MG/DL (ref 8–26)
CALCIUM SERPL-MCNC: 8.9 MG/DL (ref 8.5–10.1)
CHLORIDE SERPL-SCNC: 106 MMOL/L (ref 98–107)
CO2 SERPL-SCNC: 24 MMOL/L (ref 21–32)
CREAT SERPL-MCNC: 1.2 MG/DL (ref 0.7–1.3)
EOSINOPHIL NFR BLD: 3 % (ref 0–3)
ERYTHROCYTE [DISTWIDTH] IN BLOOD BY AUTOMATED COUNT: 14.3 % (ref 11.5–14.5)
GFR SERPLBLD BASED ON 1.73 SQ M-ARVRAT: 70.7 ML/MIN
GLUCOSE SERPL-MCNC: 158 MG/DL (ref 70–99)
HCT VFR BLD CALC: 34.1 % (ref 39–53)
HGB BLD-MCNC: 11.3 G/DL (ref 13–17.5)
LYMPHOCYTES # BLD: 0.9 X10^3/UL (ref 1–4.8)
LYMPHOCYTES NFR BLD AUTO: 26 % (ref 24–48)
MCH RBC QN AUTO: 28 PG (ref 25–35)
MCHC RBC AUTO-ENTMCNC: 33 G/DL (ref 31–37)
MCV RBC AUTO: 84 FL (ref 79–100)
MONOCYTES NFR BLD: 7 % (ref 0–9)
NEUTROPHILS NFR BLD AUTO: 63 % (ref 31–73)
PHOSPHATE SERPL-MCNC: 3.1 MG/DL (ref 2.6–4.7)
PLATELET # BLD AUTO: 108 X10^3/UL (ref 140–400)
POTASSIUM SERPL-SCNC: 3.9 MMOL/L (ref 3.5–5.1)
RBC # BLD AUTO: 4.07 X10^6/UL (ref 4.3–5.7)
SODIUM SERPL-SCNC: 139 MMOL/L (ref 136–145)
WBC # BLD AUTO: 3.3 X10^3/UL (ref 4–11)

## 2017-02-25 RX ADMIN — INSULIN ASPART SCH UNITS: 100 INJECTION, SOLUTION INTRAVENOUS; SUBCUTANEOUS at 08:30

## 2017-02-25 RX ADMIN — INSULIN ASPART SCH UNITS: 100 INJECTION, SOLUTION INTRAVENOUS; SUBCUTANEOUS at 12:17

## 2017-02-25 RX ADMIN — TAMSULOSIN HYDROCHLORIDE SCH MG: 0.4 CAPSULE ORAL at 21:10

## 2017-02-25 RX ADMIN — INSULIN ASPART SCH UNITS: 100 INJECTION, SOLUTION INTRAVENOUS; SUBCUTANEOUS at 12:00

## 2017-02-25 RX ADMIN — INSULIN ASPART SCH UNITS: 100 INJECTION, SOLUTION INTRAVENOUS; SUBCUTANEOUS at 18:09

## 2017-02-25 RX ADMIN — INSULIN ASPART SCH UNITS: 100 INJECTION, SOLUTION INTRAVENOUS; SUBCUTANEOUS at 08:31

## 2017-02-25 RX ADMIN — GLYBURIDE SCH MG: 5 TABLET ORAL at 18:03

## 2017-02-25 RX ADMIN — GLYBURIDE SCH MG: 5 TABLET ORAL at 08:12

## 2017-02-25 RX ADMIN — INSULIN DETEMIR SCH UNITS: 100 INJECTION, SOLUTION SUBCUTANEOUS at 08:32

## 2017-02-25 RX ADMIN — INSULIN ASPART SCH UNITS: 100 INJECTION, SOLUTION INTRAVENOUS; SUBCUTANEOUS at 18:10

## 2017-02-25 NOTE — RAD
CT-guided left chest tube insertion



Indication: Post lung biopsy enlarging left pneumothorax.  Pulmonary was

contacted. Chest tube insertion was requested.



Anesthesia: 18 minutes moderate sedation was provided utilizing a total of 1

mg Versed and 50 mcg fentanyl, IV. The patient was appropriately monitored by

a qualified independent observer throughout the time of moderate sedation.



Consent: The procedure was explained in its entirety to the patient and/or the

patient's designated representative by a member of the treatment team. This

included a discussion of risks and benefits and acceptable alternatives to the

procedure, as well as expected consequences of no treatment at all. Discussion

of risks included, but was not limited to, those that are most frequent and

those that are rare, but possibly severe or life-threatening, as well as the

possibility of unforeseen complications.





Procedure: Informed consent was obtained from the patient. He was placed

supine on the CT scanner. Preliminary noncontrast CT images confirmed the

presence of a large postbiopsy left pneumothorax. A skin site suitable for

CT-guided chest tube insertion was selected and marked along anterolateral

aspect of low left hemithorax. That area was prepped and draped in the usual

sterile fashion. Moderate sedation was provided with IV Versed and fentanyl.

Using aseptic technique, local anesthesia, and CT guidance, a small

micropuncture sheath was successfully introduced into low left lateral pleural

space. The micropuncture sheath was removed over a guidewire. The percutaneous

tract was dilated and a 12 Tamazight locking pigtail left chest tube was easily

introduced. Completion CT images documented satisfactory position of the chest

tube, which was connected to Pleur-evac, and was secured at the skin exit site

utilizing suture and sterile dressing. Patient tolerated the procedure well

without apparent complication.



Impression: Successful, uneventful CT-guided left chest tube insertion, as

described.





PQRS Compliance Statement:



One or more of the following individualized dose reduction techniques was

utilized for this procedure:

1. Automated exposure control.

2. Adjustment of MA and/or KV according to patient size.

3. Iterative reconstruction technique.

## 2017-02-25 NOTE — PDOC
PROGRESS NOTES


Chief Complaint


Chief Complaint


Dysphagia








ASSESSMENT AND PLAN:


1.   Pneumothorax:  2/2 lung bx.  appreciate Dr Mena's help with management of 

CT.  clamped today


2.   Lung mass in L hilum:  suspected lung CA.  seen by med and rad onc.  O/P F/

U when bx results available


3.   Globus sensation:  poss mass effect from lung CA.  no mediastinal LAD


4.  MAYANK:  vasomotor etiology.  resolved


5.  Diabetes Mellitus: new diagnosis.  on long and short acting insulin as well 

as glyburide.  stop oral in favor of trajenta


6.  Prophylaxis:  SCDs


7.  Dispo:  home post d/c CT (?tomorrow)


 


 








Vitals


Vitals





 Vital Signs








  Date Time  Temp Pulse Resp B/P Pulse Ox O2 Delivery O2 Flow Rate FiO2


 


2/25/17 08:00      Nasal Cannula 2.0 


 


2/25/17 07:46 98.7 91 16 115/82 98   





 98.7       











Physical Exam


General:  Alert, Oriented X3, Cooperative, No acute distress


Heart:  Regular rate, Normal S1, Normal S2


Lungs:  Other (decrease bs)


Abdomen:  Normal bowel sounds, Soft, No tenderness, No hepatosplenomegaly


Extremities:  No tenderness/swelling


Skin:  No rashes





Labs


LABS





Laboratory Tests








Test


  2/24/17


11:12 2/24/17


16:19 2/25/17


04:31 2/25/17


07:22


 


Glucose (Fingerstick)


  135mg/dL


(70-99) 185mg/dL


(70-99) 


  158mg/dL


(70-99)


 


White Blood Count


  


  


  3.3x10^3/uL


(4.0-11.0) 


 


 


Red Blood Count


  


  


  4.07x10^6/uL


(4.30-5.70) 


 


 


Hemoglobin


  


  


  11.3g/dL


(13.0-17.5) 


 


 


Hematocrit


  


  


  34.1%


(39.0-53.0) 


 


 


Mean Corpuscular Volume   84fL ()  


 


Mean Corpuscular Hemoglobin   28pg (25-35)  


 


Mean Corpuscular Hemoglobin


Concent 


  


  33g/dL (31-37) 


  


 


 


Red Cell Distribution Width


  


  


  14.3%


(11.5-14.5) 


 


 


Platelet Count


  


  


  108x10^3/uL


(140-400) 


 


 


Neutrophils (%) (Auto)   63% (31-73)  


 


Lymphocytes (%) (Auto)   26% (24-48)  


 


Monocytes (%) (Auto)   7% (0-9)  


 


Eosinophils (%) (Auto)   3% (0-3)  


 


Basophils (%) (Auto)   1% (0-3)  


 


Neutrophils # (Auto)


  


  


  2.1x10^3uL


(1.8-7.7) 


 


 


Lymphocytes # (Auto)


  


  


  0.9x10^3/uL


(1.0-4.8) 


 


 


Monocytes # (Auto)


  


  


  0.2x10^3/uL


(0.0-1.1) 


 


 


Eosinophils # (Auto)


  


  


  0.1x10^3/uL


(0.0-0.7) 


 


 


Basophils # (Auto)


  


  


  0.0x10^3/uL


(0.0-0.2) 


 


 


Sodium Level


  


  


  139mmol/L


(136-145) 


 


 


Potassium Level


  


  


  3.9mmol/L


(3.5-5.1) 


 


 


Chloride Level


  


  


  106mmol/L


() 


 


 


Carbon Dioxide Level


  


  


  24mmol/L


(21-32) 


 


 


Anion Gap   9 (6-14)  


 


Blood Urea Nitrogen   14mg/dL (8-26)  


 


Creatinine


  


  


  1.2mg/dL


(0.7-1.3) 


 


 


Estimated GFR


(Cockcroft-Gault) 


  


  70.7 


  


 


 


Glucose Level


  


  


  158mg/dL


(70-99) 


 


 


Calcium Level


  


  


  8.9mg/dL


(8.5-10.1) 


 


 


Phosphorus Level


  


  


  3.1mg/dL


(2.6-4.7) 


 


 


Magnesium Level


  


  


  1.7mg/dL


(1.8-2.4) 


 


 


Albumin


  


  


  2.4g/dL


(3.4-5.0) 


 











Review of Systems


Review of Systems


feels ok, save for some discomfort from CT








YUNG MULLINS MD Feb 25, 2017 10:47

## 2017-02-25 NOTE — PDOC
PULMONARY PROGRESS NOTES


Subjective


developed PTX post lung biopsy


s/p left chest tube, PTX resolved


Vitals





 Vital Signs








  Date Time  Temp Pulse Resp B/P Pulse Ox O2 Delivery O2 Flow Rate FiO2


 


2/25/17 10:54 97.9 94 18 115/77 97 Nasal Cannula 2.0 





 97.9       








General:  Alert, No acute distress


Lungs:  Other (decrease bs)


Cardiovascular:  S1


Abdomen:  Soft


Neuro Exam:  Alert


Extremities:  No Edema


Skin:  Warm


Labs





Laboratory Tests








Test


  2/23/17


16:23 2/23/17


20:46 2/24/17


06:20 2/24/17


07:15


 


Glucose (Fingerstick)


  236mg/dL


(70-99) 233mg/dL


(70-99) 


  145mg/dL


(70-99)


 


White Blood Count


  


  


  4.5x10^3/uL


(4.0-11.0) 


 


 


Red Blood Count


  


  


  4.16x10^6/uL


(4.30-5.70) 


 


 


Hemoglobin


  


  


  11.4g/dL


(13.0-17.5) 


 


 


Hematocrit


  


  


  34.8%


(39.0-53.0) 


 


 


Mean Corpuscular Volume   84fL ()  


 


Mean Corpuscular Hemoglobin   28pg (25-35)  


 


Mean Corpuscular Hemoglobin


Concent 


  


  33g/dL (31-37) 


  


 


 


Red Cell Distribution Width


  


  


  14.3%


(11.5-14.5) 


 


 


Platelet Count


  


  


  103x10^3/uL


(140-400) 


 


 


Neutrophils (%) (Auto)   68% (31-73)  


 


Lymphocytes (%) (Auto)   23% (24-48)  


 


Monocytes (%) (Auto)   7% (0-9)  


 


Eosinophils (%) (Auto)   2% (0-3)  


 


Basophils (%) (Auto)   0% (0-3)  


 


Neutrophils # (Auto)


  


  


  3.1x10^3uL


(1.8-7.7) 


 


 


Lymphocytes # (Auto)


  


  


  1.1x10^3/uL


(1.0-4.8) 


 


 


Monocytes # (Auto)


  


  


  0.3x10^3/uL


(0.0-1.1) 


 


 


Eosinophils # (Auto)


  


  


  0.1x10^3/uL


(0.0-0.7) 


 


 


Basophils # (Auto)


  


  


  0.0x10^3/uL


(0.0-0.2) 


 


 


Sodium Level


  


  


  139mmol/L


(136-145) 


 


 


Potassium Level


  


  


  4.7mmol/L


(3.5-5.1) 


 


 


Chloride Level


  


  


  106mmol/L


() 


 


 


Carbon Dioxide Level


  


  


  25mmol/L


(21-32) 


 


 


Anion Gap   8 (6-14)  


 


Blood Urea Nitrogen   12mg/dL (8-26)  


 


Creatinine


  


  


  1.3mg/dL


(0.7-1.3) 


 


 


Estimated GFR


(Cockcroft-Gault) 


  


  64.4 


  


 


 


Glucose Level


  


  


  149mg/dL


(70-99) 


 


 


Calcium Level


  


  


  9.0mg/dL


(8.5-10.1) 


 


 


Phosphorus Level


  


  


  3.2mg/dL


(2.6-4.7) 


 


 


Magnesium Level


  


  


  1.8mg/dL


(1.8-2.4) 


 


 


Albumin


  


  


  2.7g/dL


(3.4-5.0) 


 














Test


  2/24/17


11:12 2/24/17


16:19 2/25/17


04:31 2/25/17


07:22


 


Glucose (Fingerstick)


  135mg/dL


(70-99) 185mg/dL


(70-99) 


  158mg/dL


(70-99)


 


White Blood Count


  


  


  3.3x10^3/uL


(4.0-11.0) 


 


 


Red Blood Count


  


  


  4.07x10^6/uL


(4.30-5.70) 


 


 


Hemoglobin


  


  


  11.3g/dL


(13.0-17.5) 


 


 


Hematocrit


  


  


  34.1%


(39.0-53.0) 


 


 


Mean Corpuscular Volume   84fL ()  


 


Mean Corpuscular Hemoglobin   28pg (25-35)  


 


Mean Corpuscular Hemoglobin


Concent 


  


  33g/dL (31-37) 


  


 


 


Red Cell Distribution Width


  


  


  14.3%


(11.5-14.5) 


 


 


Platelet Count


  


  


  108x10^3/uL


(140-400) 


 


 


Neutrophils (%) (Auto)   63% (31-73)  


 


Lymphocytes (%) (Auto)   26% (24-48)  


 


Monocytes (%) (Auto)   7% (0-9)  


 


Eosinophils (%) (Auto)   3% (0-3)  


 


Basophils (%) (Auto)   1% (0-3)  


 


Neutrophils # (Auto)


  


  


  2.1x10^3uL


(1.8-7.7) 


 


 


Lymphocytes # (Auto)


  


  


  0.9x10^3/uL


(1.0-4.8) 


 


 


Monocytes # (Auto)


  


  


  0.2x10^3/uL


(0.0-1.1) 


 


 


Eosinophils # (Auto)


  


  


  0.1x10^3/uL


(0.0-0.7) 


 


 


Basophils # (Auto)


  


  


  0.0x10^3/uL


(0.0-0.2) 


 


 


Sodium Level


  


  


  139mmol/L


(136-145) 


 


 


Potassium Level


  


  


  3.9mmol/L


(3.5-5.1) 


 


 


Chloride Level


  


  


  106mmol/L


() 


 


 


Carbon Dioxide Level


  


  


  24mmol/L


(21-32) 


 


 


Anion Gap   9 (6-14)  


 


Blood Urea Nitrogen   14mg/dL (8-26)  


 


Creatinine


  


  


  1.2mg/dL


(0.7-1.3) 


 


 


Estimated GFR


(Cockcroft-Gault) 


  


  70.7 


  


 


 


Glucose Level


  


  


  158mg/dL


(70-99) 


 


 


Calcium Level


  


  


  8.9mg/dL


(8.5-10.1) 


 


 


Phosphorus Level


  


  


  3.1mg/dL


(2.6-4.7) 


 


 


Magnesium Level


  


  


  1.7mg/dL


(1.8-2.4) 


 


 


Albumin


  


  


  2.4g/dL


(3.4-5.0) 


 














Test


  2/25/17


10:20 


  


  


 


 


Glucose (Fingerstick)


  136mg/dL


(70-99) 


  


  


 








Laboratory Tests








Test


  2/24/17


16:19 2/25/17


04:31 2/25/17


07:22 2/25/17


10:20


 


Glucose (Fingerstick)


  185mg/dL


(70-99) 


  158mg/dL


(70-99) 136mg/dL


(70-99)


 


White Blood Count


  


  3.3x10^3/uL


(4.0-11.0) 


  


 


 


Red Blood Count


  


  4.07x10^6/uL


(4.30-5.70) 


  


 


 


Hemoglobin


  


  11.3g/dL


(13.0-17.5) 


  


 


 


Hematocrit


  


  34.1%


(39.0-53.0) 


  


 


 


Mean Corpuscular Volume  84fL ()   


 


Mean Corpuscular Hemoglobin  28pg (25-35)   


 


Mean Corpuscular Hemoglobin


Concent 


  33g/dL (31-37) 


  


  


 


 


Red Cell Distribution Width


  


  14.3%


(11.5-14.5) 


  


 


 


Platelet Count


  


  108x10^3/uL


(140-400) 


  


 


 


Neutrophils (%) (Auto)  63% (31-73)   


 


Lymphocytes (%) (Auto)  26% (24-48)   


 


Monocytes (%) (Auto)  7% (0-9)   


 


Eosinophils (%) (Auto)  3% (0-3)   


 


Basophils (%) (Auto)  1% (0-3)   


 


Neutrophils # (Auto)


  


  2.1x10^3uL


(1.8-7.7) 


  


 


 


Lymphocytes # (Auto)


  


  0.9x10^3/uL


(1.0-4.8) 


  


 


 


Monocytes # (Auto)


  


  0.2x10^3/uL


(0.0-1.1) 


  


 


 


Eosinophils # (Auto)


  


  0.1x10^3/uL


(0.0-0.7) 


  


 


 


Basophils # (Auto)


  


  0.0x10^3/uL


(0.0-0.2) 


  


 


 


Sodium Level


  


  139mmol/L


(136-145) 


  


 


 


Potassium Level


  


  3.9mmol/L


(3.5-5.1) 


  


 


 


Chloride Level


  


  106mmol/L


() 


  


 


 


Carbon Dioxide Level


  


  24mmol/L


(21-32) 


  


 


 


Anion Gap  9 (6-14)   


 


Blood Urea Nitrogen  14mg/dL (8-26)   


 


Creatinine


  


  1.2mg/dL


(0.7-1.3) 


  


 


 


Estimated GFR


(Cockcroft-Gault) 


  70.7 


  


  


 


 


Glucose Level


  


  158mg/dL


(70-99) 


  


 


 


Calcium Level


  


  8.9mg/dL


(8.5-10.1) 


  


 


 


Phosphorus Level


  


  3.1mg/dL


(2.6-4.7) 


  


 


 


Magnesium Level


  


  1.7mg/dL


(1.8-2.4) 


  


 


 


Albumin


  


  2.4g/dL


(3.4-5.0) 


  


 








Medications





Active Scripts








 Medications  Dose


 Route/Sig Days Date Category


 


 Cyclobenzaprine


 Hcl 10 Mg Tablet  


    2/21/17 Reported


 


 Omeprazole 20 Mg


 Capsule.dr  


    2/21/17 Reported


 


 Celecoxib 200 Mg


 Capsule  


    2/21/17 Reported


 


 Hydrocodone-Apap


 5-300


  (Hydrocodone


 Bit/Acetaminophen)


 1 Each Tablet  


    2/21/17 Reported


 


 Losartan-Hctz


 50-12.5 Mg Tab


  (Losartan/Hydrochlorothiazide)


 1 Each Tablet  


    2/21/17 Reported


 


 Novolog Flexpen


  (Insulin Aspart)


 100 Unit/1 Ml


 Insuln.pen  15 Unit


 SQ 1X   2/21/17 Reported











Impression


.


1.  A 4.4 cm left lower lobe mass in the patient, who is an ex-smoker.  He


smoked for 30 years.  He has lost about 20 pounds in the last 2 weeks. s/p non-

diagnostic


 bronchoscopy. followed by ct guided left mass biopsy 2/23, preliminary path c/

w sq cell cancer


2.  Suspected underlying chronic obstructive pulmonary disease.


3.  Hyperglycemia, present on admission, currently improved.


4.  Acute renal failure present on admission, which is improved with hydration.


5.  Moderate protein-calorie malnutrition.


6.  PTX post biopsy, resolved with chest tube





Plan


.





1.  Biopsy c/w squamous cell cancer


2.  will need staging PET  and PFT as OP/, may be a surgical candidate


3.  clamp chest tube today and If no PTX in am, remove chest tube in am


4.  f/u CXR daily


5.  Continue with present bronchodilators.


6.  d/w PCP








ACRLITOS CHURCH MD Feb 25, 2017 14:08

## 2017-02-25 NOTE — RAD
Portable chest, 2/25/2017:



History: Follow-up chest tube and pneumothorax



Comparison is made to yesterday's study. The left-sided pleural drain is

unchanged in position. There is no recurrent pneumothorax on the left. The

left parahilar mass and associated infiltrate is unchanged. There is moderate

developing atelectasis/infiltrate in the right base. The heart size and

pulmonary vascularity are normal. No significant pleural fluid is seen.



IMPRESSION:

1. No evidence of recurrent pneumothorax.

2. Unchanged left parahilar mass and associated infiltrate.

3. Developing moderate right basilar atelectasis/infiltrate.

## 2017-02-26 VITALS — SYSTOLIC BLOOD PRESSURE: 113 MMHG | DIASTOLIC BLOOD PRESSURE: 71 MMHG

## 2017-02-26 VITALS — DIASTOLIC BLOOD PRESSURE: 65 MMHG | SYSTOLIC BLOOD PRESSURE: 102 MMHG

## 2017-02-26 VITALS — SYSTOLIC BLOOD PRESSURE: 125 MMHG | DIASTOLIC BLOOD PRESSURE: 81 MMHG

## 2017-02-26 VITALS — SYSTOLIC BLOOD PRESSURE: 116 MMHG | DIASTOLIC BLOOD PRESSURE: 68 MMHG

## 2017-02-26 VITALS — DIASTOLIC BLOOD PRESSURE: 71 MMHG | SYSTOLIC BLOOD PRESSURE: 112 MMHG

## 2017-02-26 VITALS — SYSTOLIC BLOOD PRESSURE: 125 MMHG | DIASTOLIC BLOOD PRESSURE: 66 MMHG

## 2017-02-26 LAB
ALBUMIN SERPL-MCNC: 2.5 G/DL (ref 3.4–5)
ANION GAP SERPL CALC-SCNC: 6 MMOL/L (ref 6–14)
BASOPHILS # BLD AUTO: 0 X10^3/UL (ref 0–0.2)
BASOPHILS NFR BLD: 0 % (ref 0–3)
BUN SERPL-MCNC: 15 MG/DL (ref 8–26)
CALCIUM SERPL-MCNC: 9 MG/DL (ref 8.5–10.1)
CHLORIDE SERPL-SCNC: 106 MMOL/L (ref 98–107)
CO2 SERPL-SCNC: 27 MMOL/L (ref 21–32)
CREAT SERPL-MCNC: 1.3 MG/DL (ref 0.7–1.3)
EOSINOPHIL NFR BLD: 3 % (ref 0–3)
ERYTHROCYTE [DISTWIDTH] IN BLOOD BY AUTOMATED COUNT: 14.1 % (ref 11.5–14.5)
GFR SERPLBLD BASED ON 1.73 SQ M-ARVRAT: 64.4 ML/MIN
GLUCOSE SERPL-MCNC: 163 MG/DL (ref 70–99)
HCT VFR BLD CALC: 34.5 % (ref 39–53)
HGB BLD-MCNC: 11.4 G/DL (ref 13–17.5)
LYMPHOCYTES # BLD: 0.7 X10^3/UL (ref 1–4.8)
LYMPHOCYTES NFR BLD AUTO: 21 % (ref 24–48)
MCH RBC QN AUTO: 27 PG (ref 25–35)
MCHC RBC AUTO-ENTMCNC: 33 G/DL (ref 31–37)
MCV RBC AUTO: 82 FL (ref 79–100)
MONOCYTES NFR BLD: 8 % (ref 0–9)
NEUTROPHILS NFR BLD AUTO: 68 % (ref 31–73)
PHOSPHATE SERPL-MCNC: 2.9 MG/DL (ref 2.6–4.7)
PLATELET # BLD AUTO: 136 X10^3/UL (ref 140–400)
POTASSIUM SERPL-SCNC: 4.4 MMOL/L (ref 3.5–5.1)
RBC # BLD AUTO: 4.19 X10^6/UL (ref 4.3–5.7)
SODIUM SERPL-SCNC: 139 MMOL/L (ref 136–145)
WBC # BLD AUTO: 3.6 X10^3/UL (ref 4–11)

## 2017-02-26 RX ADMIN — INSULIN ASPART SCH UNITS: 100 INJECTION, SOLUTION INTRAVENOUS; SUBCUTANEOUS at 12:47

## 2017-02-26 RX ADMIN — INSULIN ASPART SCH UNITS: 100 INJECTION, SOLUTION INTRAVENOUS; SUBCUTANEOUS at 12:48

## 2017-02-26 RX ADMIN — TAMSULOSIN HYDROCHLORIDE SCH MG: 0.4 CAPSULE ORAL at 20:47

## 2017-02-26 RX ADMIN — INSULIN DETEMIR SCH UNITS: 100 INJECTION, SOLUTION SUBCUTANEOUS at 08:38

## 2017-02-26 RX ADMIN — LINAGLIPTIN SCH MG: 5 TABLET, FILM COATED ORAL at 08:22

## 2017-02-26 RX ADMIN — INSULIN ASPART SCH UNITS: 100 INJECTION, SOLUTION INTRAVENOUS; SUBCUTANEOUS at 08:38

## 2017-02-26 RX ADMIN — INSULIN ASPART SCH UNITS: 100 INJECTION, SOLUTION INTRAVENOUS; SUBCUTANEOUS at 17:00

## 2017-02-26 RX ADMIN — INSULIN ASPART SCH UNITS: 100 INJECTION, SOLUTION INTRAVENOUS; SUBCUTANEOUS at 18:08

## 2017-02-26 RX ADMIN — INSULIN ASPART SCH UNITS: 100 INJECTION, SOLUTION INTRAVENOUS; SUBCUTANEOUS at 08:37

## 2017-02-26 NOTE — PDOC
PROGRESS NOTES


Chief Complaint


Chief Complaint


Dysphagia








ASSESSMENT AND PLAN:


1.   Pneumothorax:  2/2 lung bx.  appreciate Dr Mena's help with management of 

CT.   d/c.ed today.  recheck CXR in AM


2.   Lung mass in L hilum:  squmous cell CA.  seen by med and rad onc.  O/P F/U

  


3.   Globus sensation:  poss mass effect from lung CA.  no mediastinal LAD


4.  MAYANK:  vasomotor etiology.  resolved


5.  Diabetes Mellitus: new diagnosis.  on long and short acting insulin as well 

as   tradjenta.  will need scripts


6.  Prophylaxis:  SCDs


7.  Dispo:  home prob in AM


 


 








Vitals


Vitals





 Vital Signs








  Date Time  Temp Pulse Resp B/P Pulse Ox O2 Delivery O2 Flow Rate FiO2


 


2/26/17 20:00      Nasal Cannula 2.0 


 


2/26/17 19:00 98.8 100 20 116/68 95   





 98.8       











Physical Exam


General:  Alert, Oriented X3, Cooperative, No acute distress


Heart:  Regular rate, Normal S1, Normal S2


Lungs:  Other (decrease bs)


Abdomen:  Normal bowel sounds, Soft, No tenderness, No hepatosplenomegaly


Extremities:  No tenderness/swelling


Skin:  No rashes





Labs


LABS





Laboratory Tests








Test


  2/26/17


04:41 2/26/17


04:45 2/26/17


07:36 2/26/17


11:23


 


White Blood Count


  3.6x10^3/uL


(4.0-11.0) 


  


  


 


 


Red Blood Count


  4.19x10^6/uL


(4.30-5.70) 


  


  


 


 


Hemoglobin


  11.4g/dL


(13.0-17.5) 


  


  


 


 


Hematocrit


  34.5%


(39.0-53.0) 


  


  


 


 


Mean Corpuscular Volume 82fL ()    


 


Mean Corpuscular Hemoglobin 27pg (25-35)    


 


Mean Corpuscular Hemoglobin


Concent 33g/dL (31-37) 


  


  


  


 


 


Red Cell Distribution Width


  14.1%


(11.5-14.5) 


  


  


 


 


Platelet Count


  136x10^3/uL


(140-400) 


  


  


 


 


Neutrophils (%) (Auto) 68% (31-73)    


 


Lymphocytes (%) (Auto) 21% (24-48)    


 


Monocytes (%) (Auto) 8% (0-9)    


 


Eosinophils (%) (Auto) 3% (0-3)    


 


Basophils (%) (Auto) 0% (0-3)    


 


Neutrophils # (Auto)


  2.4x10^3uL


(1.8-7.7) 


  


  


 


 


Lymphocytes # (Auto)


  0.7x10^3/uL


(1.0-4.8) 


  


  


 


 


Monocytes # (Auto)


  0.3x10^3/uL


(0.0-1.1) 


  


  


 


 


Eosinophils # (Auto)


  0.1x10^3/uL


(0.0-0.7) 


  


  


 


 


Basophils # (Auto)


  0.0x10^3/uL


(0.0-0.2) 


  


  


 


 


Sodium Level


  


  139mmol/L


(136-145) 


  


 


 


Potassium Level


  


  4.4mmol/L


(3.5-5.1) 


  


 


 


Chloride Level


  


  106mmol/L


() 


  


 


 


Carbon Dioxide Level


  


  27mmol/L


(21-32) 


  


 


 


Anion Gap  6 (6-14)   


 


Blood Urea Nitrogen  15mg/dL (8-26)   


 


Creatinine


  


  1.3mg/dL


(0.7-1.3) 


  


 


 


Estimated GFR


(Cockcroft-Gault) 


  64.4 


  


  


 


 


Glucose Level


  


  163mg/dL


(70-99) 


  


 


 


Calcium Level


  


  9.0mg/dL


(8.5-10.1) 


  


 


 


Phosphorus Level


  


  2.9mg/dL


(2.6-4.7) 


  


 


 


Magnesium Level


  


  1.7mg/dL


(1.8-2.4) 


  


 


 


Albumin


  


  2.5g/dL


(3.4-5.0) 


  


 


 


Glucose (Fingerstick)


  


  


  183mg/dL


(70-99) 173mg/dL


(70-99)














Test


  2/26/17


16:48 2/26/17


20:49 


  


 


 


Glucose (Fingerstick)


  97mg/dL


(70-99) 163mg/dL


(70-99) 


  


 











Review of Systems


Review of Systems


feels good today, no CP








YUNG MULLINS MD Feb 26, 2017 23:27

## 2017-02-26 NOTE — PDOC
PULMONARY PROGRESS NOTES


Subjective


developed PTX post lung biopsy


s/p left chest tube, PTX resolved


Vitals





 Vital Signs








  Date Time  Temp Pulse Resp B/P Pulse Ox O2 Delivery O2 Flow Rate FiO2


 


2/26/17 03:00 97.9 92 20 102/65 99 Nasal Cannula 2.0 





 97.9       








General:  Alert, No acute distress


Lungs:  Other (decrease bs)


Cardiovascular:  S1


Abdomen:  Soft


Neuro Exam:  Alert


Extremities:  No Edema


Skin:  Warm


Labs





Laboratory Tests








Test


  2/24/17


11:12 2/24/17


16:19 2/25/17


04:31 2/25/17


07:22


 


Glucose (Fingerstick)


  135mg/dL


(70-99) 185mg/dL


(70-99) 


  158mg/dL


(70-99)


 


White Blood Count


  


  


  3.3x10^3/uL


(4.0-11.0) 


 


 


Red Blood Count


  


  


  4.07x10^6/uL


(4.30-5.70) 


 


 


Hemoglobin


  


  


  11.3g/dL


(13.0-17.5) 


 


 


Hematocrit


  


  


  34.1%


(39.0-53.0) 


 


 


Mean Corpuscular Volume   84fL ()  


 


Mean Corpuscular Hemoglobin   28pg (25-35)  


 


Mean Corpuscular Hemoglobin


Concent 


  


  33g/dL (31-37) 


  


 


 


Red Cell Distribution Width


  


  


  14.3%


(11.5-14.5) 


 


 


Platelet Count


  


  


  108x10^3/uL


(140-400) 


 


 


Neutrophils (%) (Auto)   63% (31-73)  


 


Lymphocytes (%) (Auto)   26% (24-48)  


 


Monocytes (%) (Auto)   7% (0-9)  


 


Eosinophils (%) (Auto)   3% (0-3)  


 


Basophils (%) (Auto)   1% (0-3)  


 


Neutrophils # (Auto)


  


  


  2.1x10^3uL


(1.8-7.7) 


 


 


Lymphocytes # (Auto)


  


  


  0.9x10^3/uL


(1.0-4.8) 


 


 


Monocytes # (Auto)


  


  


  0.2x10^3/uL


(0.0-1.1) 


 


 


Eosinophils # (Auto)


  


  


  0.1x10^3/uL


(0.0-0.7) 


 


 


Basophils # (Auto)


  


  


  0.0x10^3/uL


(0.0-0.2) 


 


 


Sodium Level


  


  


  139mmol/L


(136-145) 


 


 


Potassium Level


  


  


  3.9mmol/L


(3.5-5.1) 


 


 


Chloride Level


  


  


  106mmol/L


() 


 


 


Carbon Dioxide Level


  


  


  24mmol/L


(21-32) 


 


 


Anion Gap   9 (6-14)  


 


Blood Urea Nitrogen   14mg/dL (8-26)  


 


Creatinine


  


  


  1.2mg/dL


(0.7-1.3) 


 


 


Estimated GFR


(Cockcroft-Gault) 


  


  70.7 


  


 


 


Glucose Level


  


  


  158mg/dL


(70-99) 


 


 


Calcium Level


  


  


  8.9mg/dL


(8.5-10.1) 


 


 


Phosphorus Level


  


  


  3.1mg/dL


(2.6-4.7) 


 


 


Magnesium Level


  


  


  1.7mg/dL


(1.8-2.4) 


 


 


Albumin


  


  


  2.4g/dL


(3.4-5.0) 


 














Test


  2/25/17


10:20 2/25/17


16:58 2/25/17


21:12 2/26/17


04:41


 


Glucose (Fingerstick)


  136mg/dL


(70-99) 199mg/dL


(70-99) 165mg/dL


(70-99) 


 


 


White Blood Count


  


  


  


  3.6x10^3/uL


(4.0-11.0)


 


Red Blood Count


  


  


  


  4.19x10^6/uL


(4.30-5.70)


 


Hemoglobin


  


  


  


  11.4g/dL


(13.0-17.5)


 


Hematocrit


  


  


  


  34.5%


(39.0-53.0)


 


Mean Corpuscular Volume    82fL () 


 


Mean Corpuscular Hemoglobin    27pg (25-35) 


 


Mean Corpuscular Hemoglobin


Concent 


  


  


  33g/dL (31-37) 


 


 


Red Cell Distribution Width


  


  


  


  14.1%


(11.5-14.5)


 


Platelet Count


  


  


  


  136x10^3/uL


(140-400)


 


Neutrophils (%) (Auto)    68% (31-73) 


 


Lymphocytes (%) (Auto)    21% (24-48) 


 


Monocytes (%) (Auto)    8% (0-9) 


 


Eosinophils (%) (Auto)    3% (0-3) 


 


Basophils (%) (Auto)    0% (0-3) 


 


Neutrophils # (Auto)


  


  


  


  2.4x10^3uL


(1.8-7.7)


 


Lymphocytes # (Auto)


  


  


  


  0.7x10^3/uL


(1.0-4.8)


 


Monocytes # (Auto)


  


  


  


  0.3x10^3/uL


(0.0-1.1)


 


Eosinophils # (Auto)


  


  


  


  0.1x10^3/uL


(0.0-0.7)


 


Basophils # (Auto)


  


  


  


  0.0x10^3/uL


(0.0-0.2)














Test


  2/26/17


04:45 2/26/17


07:36 


  


 


 


Sodium Level


  139mmol/L


(136-145) 


  


  


 


 


Potassium Level


  4.4mmol/L


(3.5-5.1) 


  


  


 


 


Chloride Level


  106mmol/L


() 


  


  


 


 


Carbon Dioxide Level


  27mmol/L


(21-32) 


  


  


 


 


Anion Gap 6 (6-14)    


 


Blood Urea Nitrogen 15mg/dL (8-26)    


 


Creatinine


  1.3mg/dL


(0.7-1.3) 


  


  


 


 


Estimated GFR


(Cockcroft-Gault) 64.4 


  


  


  


 


 


Glucose Level


  163mg/dL


(70-99) 


  


  


 


 


Calcium Level


  9.0mg/dL


(8.5-10.1) 


  


  


 


 


Phosphorus Level


  2.9mg/dL


(2.6-4.7) 


  


  


 


 


Magnesium Level


  1.7mg/dL


(1.8-2.4) 


  


  


 


 


Albumin


  2.5g/dL


(3.4-5.0) 


  


  


 


 


Glucose (Fingerstick)


  


  183mg/dL


(70-99) 


  


 








Laboratory Tests








Test


  2/25/17


10:20 2/25/17


16:58 2/25/17


21:12 2/26/17


04:41


 


Glucose (Fingerstick)


  136mg/dL


(70-99) 199mg/dL


(70-99) 165mg/dL


(70-99) 


 


 


White Blood Count


  


  


  


  3.6x10^3/uL


(4.0-11.0)


 


Red Blood Count


  


  


  


  4.19x10^6/uL


(4.30-5.70)


 


Hemoglobin


  


  


  


  11.4g/dL


(13.0-17.5)


 


Hematocrit


  


  


  


  34.5%


(39.0-53.0)


 


Mean Corpuscular Volume    82fL () 


 


Mean Corpuscular Hemoglobin    27pg (25-35) 


 


Mean Corpuscular Hemoglobin


Concent 


  


  


  33g/dL (31-37) 


 


 


Red Cell Distribution Width


  


  


  


  14.1%


(11.5-14.5)


 


Platelet Count


  


  


  


  136x10^3/uL


(140-400)


 


Neutrophils (%) (Auto)    68% (31-73) 


 


Lymphocytes (%) (Auto)    21% (24-48) 


 


Monocytes (%) (Auto)    8% (0-9) 


 


Eosinophils (%) (Auto)    3% (0-3) 


 


Basophils (%) (Auto)    0% (0-3) 


 


Neutrophils # (Auto)


  


  


  


  2.4x10^3uL


(1.8-7.7)


 


Lymphocytes # (Auto)


  


  


  


  0.7x10^3/uL


(1.0-4.8)


 


Monocytes # (Auto)


  


  


  


  0.3x10^3/uL


(0.0-1.1)


 


Eosinophils # (Auto)


  


  


  


  0.1x10^3/uL


(0.0-0.7)


 


Basophils # (Auto)


  


  


  


  0.0x10^3/uL


(0.0-0.2)














Test


  2/26/17


04:45 2/26/17


07:36 


  


 


 


Sodium Level


  139mmol/L


(136-145) 


  


  


 


 


Potassium Level


  4.4mmol/L


(3.5-5.1) 


  


  


 


 


Chloride Level


  106mmol/L


() 


  


  


 


 


Carbon Dioxide Level


  27mmol/L


(21-32) 


  


  


 


 


Anion Gap 6 (6-14)    


 


Blood Urea Nitrogen 15mg/dL (8-26)    


 


Creatinine


  1.3mg/dL


(0.7-1.3) 


  


  


 


 


Estimated GFR


(Cockcroft-Gault) 64.4 


  


  


  


 


 


Glucose Level


  163mg/dL


(70-99) 


  


  


 


 


Calcium Level


  9.0mg/dL


(8.5-10.1) 


  


  


 


 


Phosphorus Level


  2.9mg/dL


(2.6-4.7) 


  


  


 


 


Magnesium Level


  1.7mg/dL


(1.8-2.4) 


  


  


 


 


Albumin


  2.5g/dL


(3.4-5.0) 


  


  


 


 


Glucose (Fingerstick)


  


  183mg/dL


(70-99) 


  


 








Medications





Active Scripts








 Medications  Dose


 Route/Sig Days Date Category


 


 Cyclobenzaprine


 Hcl 10 Mg Tablet  


    2/21/17 Reported


 


 Omeprazole 20 Mg


 Capsule.  


    2/21/17 Reported


 


 Celecoxib 200 Mg


 Capsule  


    2/21/17 Reported


 


 Hydrocodone-Apap


 5-300


  (Hydrocodone


 Bit/Acetaminophen)


 1 Each Tablet  


    2/21/17 Reported


 


 Losartan-Hctz


 50-12.5 Mg Tab


  (Losartan/Hydrochlorothiazide)


 1 Each Tablet  


    2/21/17 Reported


 


 Novolog Flexpen


  (Insulin Aspart)


 100 Unit/1 Ml


 Insuln.pen  15 Unit


 SQ 1X   2/21/17 Reported











Impression


.


1.  A 4.4 cm left lower lobe mass in the patient, who is an ex-smoker.  He


smoked for 30 years.  He has lost about 20 pounds in the last 2 weeks. s/p non-

diagnostic


 bronchoscopy. followed by ct guided left mass biopsy 2/23, preliminary path c/

w sq cell cancer


2.  Suspected underlying chronic obstructive pulmonary disease.


3.  Hyperglycemia, present on admission, currently improved.


4.  Acute renal failure present on admission, which is improved with hydration.


5.  Moderate protein-calorie malnutrition.


6.  PTX post biopsy, resolved with chest tube





Plan


.





1.  Biopsy c/w squamous cell cancer


2.  will need staging PET  and PFT as OP/, may be a surgical candidate


3.  No PTX post clamp chest tube for 24 hrs. will remove chest tube today


4.  f/u CXR in am


5.  Continue with present bronchodilators.


6.  home in 











CARLITOS CHURCH MD Feb 26, 2017 08:28

## 2017-02-26 NOTE — RAD
Portable chest, 2/26/2017:



History: Follow-up chest tube, prior pneumothorax



Comparison is made yesterday study. The left chest tube is unchanged in

position. There is no evidence of recurrent pneumothorax. There is a

persistent left parahilar mass. Adjacent atelectasis/infiltrate has improved

slightly. Moderate right basilar atelectasis/infiltrate persists. Blunting of

the right lateral costophrenic angle raises possibility of a small amount of

right-sided pleural fluid.



IMPRESSION:

1. No evidence of recurrent pneumothorax.

2. Atelectasis/infiltrate associated with the left parahilar mass has improved

slightly.

3. Ongoing moderate right basilar atelectasis/infiltrate with probable

development of a small amount of right-sided pleural fluid.

## 2017-02-27 VITALS — DIASTOLIC BLOOD PRESSURE: 70 MMHG | SYSTOLIC BLOOD PRESSURE: 107 MMHG

## 2017-02-27 VITALS — DIASTOLIC BLOOD PRESSURE: 66 MMHG | SYSTOLIC BLOOD PRESSURE: 102 MMHG

## 2017-02-27 VITALS
DIASTOLIC BLOOD PRESSURE: 86 MMHG | SYSTOLIC BLOOD PRESSURE: 111 MMHG | SYSTOLIC BLOOD PRESSURE: 111 MMHG | SYSTOLIC BLOOD PRESSURE: 111 MMHG | DIASTOLIC BLOOD PRESSURE: 86 MMHG | DIASTOLIC BLOOD PRESSURE: 86 MMHG

## 2017-02-27 VITALS — DIASTOLIC BLOOD PRESSURE: 72 MMHG | SYSTOLIC BLOOD PRESSURE: 110 MMHG

## 2017-02-27 LAB
ALBUMIN SERPL-MCNC: 2.4 G/DL (ref 3.4–5)
ANION GAP SERPL CALC-SCNC: 10 MMOL/L (ref 6–14)
BASOPHILS # BLD AUTO: 0 X10^3/UL (ref 0–0.2)
BASOPHILS NFR BLD: 1 % (ref 0–3)
BUN SERPL-MCNC: 18 MG/DL (ref 8–26)
CALCIUM SERPL-MCNC: 9.6 MG/DL (ref 8.5–10.1)
CHLORIDE SERPL-SCNC: 107 MMOL/L (ref 98–107)
CO2 SERPL-SCNC: 24 MMOL/L (ref 21–32)
CREAT SERPL-MCNC: 1.4 MG/DL (ref 0.7–1.3)
EOSINOPHIL NFR BLD: 3 % (ref 0–3)
ERYTHROCYTE [DISTWIDTH] IN BLOOD BY AUTOMATED COUNT: 14.4 % (ref 11.5–14.5)
GFR SERPLBLD BASED ON 1.73 SQ M-ARVRAT: 59.2 ML/MIN
GLUCOSE SERPL-MCNC: 127 MG/DL (ref 70–99)
HCT VFR BLD CALC: 31.5 % (ref 39–53)
HGB BLD-MCNC: 10.5 G/DL (ref 13–17.5)
LYMPHOCYTES # BLD: 1.1 X10^3/UL (ref 1–4.8)
LYMPHOCYTES NFR BLD AUTO: 28 % (ref 24–48)
MCH RBC QN AUTO: 28 PG (ref 25–35)
MCHC RBC AUTO-ENTMCNC: 33 G/DL (ref 31–37)
MCV RBC AUTO: 84 FL (ref 79–100)
MONOCYTES NFR BLD: 13 % (ref 0–9)
NEUTROPHILS NFR BLD AUTO: 56 % (ref 31–73)
PHOSPHATE SERPL-MCNC: 2.8 MG/DL (ref 2.6–4.7)
PLATELET # BLD AUTO: 146 X10^3/UL (ref 140–400)
POTASSIUM SERPL-SCNC: 4.1 MMOL/L (ref 3.5–5.1)
RBC # BLD AUTO: 3.77 X10^6/UL (ref 4.3–5.7)
SODIUM SERPL-SCNC: 141 MMOL/L (ref 136–145)
WBC # BLD AUTO: 3.9 X10^3/UL (ref 4–11)

## 2017-02-27 RX ADMIN — INSULIN DETEMIR SCH UNITS: 100 INJECTION, SOLUTION SUBCUTANEOUS at 09:11

## 2017-02-27 RX ADMIN — LINAGLIPTIN SCH MG: 5 TABLET, FILM COATED ORAL at 08:34

## 2017-02-27 RX ADMIN — INSULIN ASPART SCH UNITS: 100 INJECTION, SOLUTION INTRAVENOUS; SUBCUTANEOUS at 13:12

## 2017-02-27 RX ADMIN — INSULIN ASPART SCH UNITS: 100 INJECTION, SOLUTION INTRAVENOUS; SUBCUTANEOUS at 12:00

## 2017-02-27 RX ADMIN — INSULIN ASPART SCH UNITS: 100 INJECTION, SOLUTION INTRAVENOUS; SUBCUTANEOUS at 09:11

## 2017-02-27 RX ADMIN — INSULIN ASPART SCH UNITS: 100 INJECTION, SOLUTION INTRAVENOUS; SUBCUTANEOUS at 08:00

## 2017-02-27 NOTE — RAD
Portable chest, 2/27/2017:



History: Chest tube removal



Comparison is made to yesterday's study. The heart size is normal. The left

chest tube has been removed. There is no evidence of recurrent pneumothorax.

There is a persistent left parahilar mass. Right basilar atelectasis has shown

considerable interval improvement. There is only minimal residual linear

atelectasis in this region. No new pulmonary abnormality is seen. There is no

evidence of pleural fluid.



IMPRESSION:

1. Interval removal of the left chest tube with no evidence of recurrent

pneumothorax.

2. Resolving right basilar atelectasis.

## 2017-02-27 NOTE — PDOC
PULMONARY PROGRESS NOTES


Subjective


developed PTX post lung biopsy


s/p left chest tube, PTX resolved


Vitals





 Vital Signs








  Date Time  Temp Pulse Resp B/P Pulse Ox O2 Delivery O2 Flow Rate FiO2


 


2/27/17 08:00      Room Air  


 


2/27/17 07:10 97.9 85 18 102/66 95   





 97.9       


 


2/26/17 20:00       2.0 








General:  Alert, No acute distress


Lungs:  Other (decrease bs)


Cardiovascular:  S1


Abdomen:  Soft


Neuro Exam:  Alert


Extremities:  No Edema


Skin:  Warm


Labs





Laboratory Tests








Test


  2/25/17


10:20 2/25/17


16:58 2/25/17


21:12 2/26/17


04:41


 


Glucose (Fingerstick)


  136mg/dL


(70-99) 199mg/dL


(70-99) 165mg/dL


(70-99) 


 


 


White Blood Count


  


  


  


  3.6x10^3/uL


(4.0-11.0)


 


Red Blood Count


  


  


  


  4.19x10^6/uL


(4.30-5.70)


 


Hemoglobin


  


  


  


  11.4g/dL


(13.0-17.5)


 


Hematocrit


  


  


  


  34.5%


(39.0-53.0)


 


Mean Corpuscular Volume    82fL () 


 


Mean Corpuscular Hemoglobin    27pg (25-35) 


 


Mean Corpuscular Hemoglobin


Concent 


  


  


  33g/dL (31-37) 


 


 


Red Cell Distribution Width


  


  


  


  14.1%


(11.5-14.5)


 


Platelet Count


  


  


  


  136x10^3/uL


(140-400)


 


Neutrophils (%) (Auto)    68% (31-73) 


 


Lymphocytes (%) (Auto)    21% (24-48) 


 


Monocytes (%) (Auto)    8% (0-9) 


 


Eosinophils (%) (Auto)    3% (0-3) 


 


Basophils (%) (Auto)    0% (0-3) 


 


Neutrophils # (Auto)


  


  


  


  2.4x10^3uL


(1.8-7.7)


 


Lymphocytes # (Auto)


  


  


  


  0.7x10^3/uL


(1.0-4.8)


 


Monocytes # (Auto)


  


  


  


  0.3x10^3/uL


(0.0-1.1)


 


Eosinophils # (Auto)


  


  


  


  0.1x10^3/uL


(0.0-0.7)


 


Basophils # (Auto)


  


  


  


  0.0x10^3/uL


(0.0-0.2)














Test


  2/26/17


04:45 2/26/17


07:36 2/26/17


11:23 2/26/17


16:48


 


Sodium Level


  139mmol/L


(136-145) 


  


  


 


 


Potassium Level


  4.4mmol/L


(3.5-5.1) 


  


  


 


 


Chloride Level


  106mmol/L


() 


  


  


 


 


Carbon Dioxide Level


  27mmol/L


(21-32) 


  


  


 


 


Anion Gap 6 (6-14)    


 


Blood Urea Nitrogen 15mg/dL (8-26)    


 


Creatinine


  1.3mg/dL


(0.7-1.3) 


  


  


 


 


Estimated GFR


(Cockcroft-Gault) 64.4 


  


  


  


 


 


Glucose Level


  163mg/dL


(70-99) 


  


  


 


 


Calcium Level


  9.0mg/dL


(8.5-10.1) 


  


  


 


 


Phosphorus Level


  2.9mg/dL


(2.6-4.7) 


  


  


 


 


Magnesium Level


  1.7mg/dL


(1.8-2.4) 


  


  


 


 


Albumin


  2.5g/dL


(3.4-5.0) 


  


  


 


 


Glucose (Fingerstick)


  


  183mg/dL


(70-99) 173mg/dL


(70-99) 97mg/dL


(70-99)














Test


  2/26/17


20:49 2/27/17


06:20 2/27/17


07:11 


 


 


Glucose (Fingerstick)


  163mg/dL


(70-99) 


  129mg/dL


(70-99) 


 


 


White Blood Count


  


  3.9x10^3/uL


(4.0-11.0) 


  


 


 


Red Blood Count


  


  3.77x10^6/uL


(4.30-5.70) 


  


 


 


Hemoglobin


  


  10.5g/dL


(13.0-17.5) 


  


 


 


Hematocrit


  


  31.5%


(39.0-53.0) 


  


 


 


Mean Corpuscular Volume  84fL ()   


 


Mean Corpuscular Hemoglobin  28pg (25-35)   


 


Mean Corpuscular Hemoglobin


Concent 


  33g/dL (31-37) 


  


  


 


 


Red Cell Distribution Width


  


  14.4%


(11.5-14.5) 


  


 


 


Platelet Count


  


  146x10^3/uL


(140-400) 


  


 


 


Neutrophils (%) (Auto)  56% (31-73)   


 


Lymphocytes (%) (Auto)  28% (24-48)   


 


Monocytes (%) (Auto)  13% (0-9)   


 


Eosinophils (%) (Auto)  3% (0-3)   


 


Basophils (%) (Auto)  1% (0-3)   


 


Neutrophils # (Auto)


  


  2.2x10^3uL


(1.8-7.7) 


  


 


 


Lymphocytes # (Auto)


  


  1.1x10^3/uL


(1.0-4.8) 


  


 


 


Monocytes # (Auto)


  


  0.5x10^3/uL


(0.0-1.1) 


  


 


 


Eosinophils # (Auto)


  


  0.1x10^3/uL


(0.0-0.7) 


  


 


 


Basophils # (Auto)


  


  0.0x10^3/uL


(0.0-0.2) 


  


 


 


Sodium Level


  


  141mmol/L


(136-145) 


  


 


 


Potassium Level


  


  4.1mmol/L


(3.5-5.1) 


  


 


 


Chloride Level


  


  107mmol/L


() 


  


 


 


Carbon Dioxide Level


  


  24mmol/L


(21-32) 


  


 


 


Anion Gap  10 (6-14)   


 


Blood Urea Nitrogen  18mg/dL (8-26)   


 


Creatinine


  


  1.4mg/dL


(0.7-1.3) 


  


 


 


Estimated GFR


(Cockcroft-Gault) 


  59.2 


  


  


 


 


Glucose Level


  


  127mg/dL


(70-99) 


  


 


 


Calcium Level


  


  9.6mg/dL


(8.5-10.1) 


  


 


 


Phosphorus Level


  


  2.8mg/dL


(2.6-4.7) 


  


 


 


Albumin


  


  2.4g/dL


(3.4-5.0) 


  


 








Laboratory Tests








Test


  2/26/17


11:23 2/26/17


16:48 2/26/17


20:49 2/27/17


06:20


 


Glucose (Fingerstick)


  173mg/dL


(70-99) 97mg/dL


(70-99) 163mg/dL


(70-99) 


 


 


White Blood Count


  


  


  


  3.9x10^3/uL


(4.0-11.0)


 


Red Blood Count


  


  


  


  3.77x10^6/uL


(4.30-5.70)


 


Hemoglobin


  


  


  


  10.5g/dL


(13.0-17.5)


 


Hematocrit


  


  


  


  31.5%


(39.0-53.0)


 


Mean Corpuscular Volume    84fL () 


 


Mean Corpuscular Hemoglobin    28pg (25-35) 


 


Mean Corpuscular Hemoglobin


Concent 


  


  


  33g/dL (31-37) 


 


 


Red Cell Distribution Width


  


  


  


  14.4%


(11.5-14.5)


 


Platelet Count


  


  


  


  146x10^3/uL


(140-400)


 


Neutrophils (%) (Auto)    56% (31-73) 


 


Lymphocytes (%) (Auto)    28% (24-48) 


 


Monocytes (%) (Auto)    13% (0-9) 


 


Eosinophils (%) (Auto)    3% (0-3) 


 


Basophils (%) (Auto)    1% (0-3) 


 


Neutrophils # (Auto)


  


  


  


  2.2x10^3uL


(1.8-7.7)


 


Lymphocytes # (Auto)


  


  


  


  1.1x10^3/uL


(1.0-4.8)


 


Monocytes # (Auto)


  


  


  


  0.5x10^3/uL


(0.0-1.1)


 


Eosinophils # (Auto)


  


  


  


  0.1x10^3/uL


(0.0-0.7)


 


Basophils # (Auto)


  


  


  


  0.0x10^3/uL


(0.0-0.2)


 


Sodium Level


  


  


  


  141mmol/L


(136-145)


 


Potassium Level


  


  


  


  4.1mmol/L


(3.5-5.1)


 


Chloride Level


  


  


  


  107mmol/L


()


 


Carbon Dioxide Level


  


  


  


  24mmol/L


(21-32)


 


Anion Gap    10 (6-14) 


 


Blood Urea Nitrogen    18mg/dL (8-26) 


 


Creatinine


  


  


  


  1.4mg/dL


(0.7-1.3)


 


Estimated GFR


(Cockcroft-Gault) 


  


  


  59.2 


 


 


Glucose Level


  


  


  


  127mg/dL


(70-99)


 


Calcium Level


  


  


  


  9.6mg/dL


(8.5-10.1)


 


Phosphorus Level


  


  


  


  2.8mg/dL


(2.6-4.7)


 


Albumin


  


  


  


  2.4g/dL


(3.4-5.0)














Test


  2/27/17


07:11 


  


  


 


 


Glucose (Fingerstick)


  129mg/dL


(70-99) 


  


  


 








Medications





Active Scripts








 Medications  Dose


 Route/Sig Days Date Category


 


 Cyclobenzaprine


 Hcl 10 Mg Tablet  


    2/21/17 Reported


 


 Omeprazole 20 Mg


 Capsule.dr  


    2/21/17 Reported


 


 Celecoxib 200 Mg


 Capsule  


    2/21/17 Reported


 


 Hydrocodone-Apap


 5-300


  (Hydrocodone


 Bit/Acetaminophen)


 1 Each Tablet  


    2/21/17 Reported


 


 Losartan-Hctz


 50-12.5 Mg Tab


  (Losartan/Hydrochlorothiazide)


 1 Each Tablet  


    2/21/17 Reported


 


 Novolog Flexpen


  (Insulin Aspart)


 100 Unit/1 Ml


 Insuln.pen  15 Unit


 SQ 1X   2/21/17 Reported











Impression


.


1.  A 4.4 cm left lower lobe mass in the patient, who is an ex-smoker.  He


smoked for 30 years.  He has lost about 20 pounds in the last 2 weeks. s/p non-

diagnostic


 bronchoscopy. followed by ct guided left mass biopsy 2/23, preliminary path c/

w sq cell cancer


2.  Suspected underlying chronic obstructive pulmonary disease.


3.  Hyperglycemia, present on admission, currently improved.


4.  Acute renal failure present on admission, which is improved with hydration.


5.  Moderate protein-calorie malnutrition.


6.  PTX post biopsy, resolved with chest tube





Plan


.





1.  Biopsy c/w squamous cell cancer


2.  will need staging PET  and PFT as OP/, may be a surgical candidate


3.  No PTX . s/p l removal of chest tube


4.  f/u with me on march 8th


5.  Continue with present bronchodilators.


6.  home today











CARLITOS CHURCH MD Feb 27, 2017 10:01

## 2017-02-28 NOTE — DS
DATE OF DISCHARGE:  02/27/2017



DISCHARGE DIAGNOSES:

1.  Pneumothorax secondary to lung biopsy.  Repeat chest x-ray showed

improvement.

2.  Lung mass, left hilum suspected squamous cell carcinoma.

3.  MAYANK, improved.

4.  Diabetes mellitus, new diagnosis.



BRIEF HOSPITAL COURSE:  A 81-year-old male patient admitted to the hospital on

02/20/2017 for difficulty swallowing.  He eventually was diagnosed with squamous

cell carcinoma and also he was diagnosed with new onset diabetes.  During the

hospitalization, he was evaluated by Dr. Mena and he had a bronchoscopy done

which is suggestive of squamous cell carcinoma.  The patient was evaluated by

Dr. Carlos, who recommend him to follow with him in the clinic.  He needs a PET

scan as an outpatient before he is sent for surgical evaluation.



Today, he deemed clinically stable enough to go home and follow up with Dr. Carlos and Dr. Mena.



DISCHARGE EXAMINATION:

GENERAL:  Alert, oriented x 3.

HEART:  S1, S2 present.

LUNGS:  Anterior chest clear.

ABDOMEN:  Soft, nontender, no organomegaly.

EXTREMITIES:  No edema.



DISCHARGE DISPOSITION:  Home.



DISCHARGE CONDITION:  Stable.



FOLLOWUP:  With Dr. Mena and Dr. Carlos in 2 weeks.



MEDICATIONS:  Reviewed and reconciled.  New scripts provided.  Please see [facility].



DIET:  Diabetic diet.



PROGNOSIS:  Guarded.



Total time spent for discharge is 32 minutes for patient education, counseling

and coordination of care.

 



______________________________

JANESSA BREAUX MD



DR:  [email or web address]  JOB#:  [id number] / [id number]

DD:  02/27/2017 16:13  DT:  02/28/2017 00:11

ÁNGELA

## 2017-03-09 ENCOUNTER — HOSPITAL ENCOUNTER (OUTPATIENT)
Dept: HOSPITAL 61 - PETSC | Age: 82
Discharge: HOME | End: 2017-03-09
Attending: INTERNAL MEDICINE
Payer: COMMERCIAL

## 2017-03-09 DIAGNOSIS — R91.8: Primary | ICD-10-CM

## 2017-03-09 DIAGNOSIS — C34.90: ICD-10-CM

## 2017-03-09 PROCEDURE — 78815 PET IMAGE W/CT SKULL-THIGH: CPT

## 2017-03-09 PROCEDURE — A9552 F18 FDG: HCPCS

## 2017-03-10 NOTE — RAD
FDG tumor localization scan, PET/CT, 3/9/2017:



History: Lung mass



Following IV injection of 12.9 mCi of 18 F-FDG, imaging was performed from the

skull base to the proximal thighs. The noncontrast CT component was performed

for attenuation correction and anatomic localization purposes rather than for

primary diagnosis. The patient's blood glucose level at the time of injection

was 150 MG/DL.



There is a hypermetabolic mass centered in the superior segment of the left

lower lobe adjacent to the left hilum. It demonstrates a maximum SUV of 12.0.

The most hypermetabolic component measures approximately 4.5 cm. Peripherally

there is infiltrate which demonstrates a lesser degree of FDG uptake.



There is a small hypermetabolic focus in the subcarinal region just to the

right of midline demonstrate a maximum issue the of 4.1. This appears to

correspond in location to a 15 x 8 mm lymph node. No other abnormal

mediastinal or pulmonary uptake is seen.



Physiologic activity is evident in the neck.



Normal GI tract and urinary tract activity is present in the abdomen and

pelvis. No hypermetabolic abdominal or pelvic process is seen. The adrenal

glands are unremarkable.



Incidental CT findings include the presence of multiple well-defined low

density hepatic lesions compatible with cysts. There is a right inguinal

hernia containing predominantly fat and spermatic cord structures. No bowel

herniation is evident.





IMPRESSION:

1. Moderate sized hypermetabolic left lower lobe pulmonary mass compatible

with a primary lung malignancy.

2. Mildly hypermetabolic subcarinal lymph node is a possibility of a small

ronald metastasis.

3. No PET evidence of distant metastatic disease.

## 2017-07-20 ENCOUNTER — HOSPITAL ENCOUNTER (OUTPATIENT)
Dept: HOSPITAL 61 - CT | Age: 82
End: 2017-07-20
Attending: RADIOLOGY
Payer: COMMERCIAL

## 2017-07-20 DIAGNOSIS — E04.9: ICD-10-CM

## 2017-07-20 DIAGNOSIS — M25.851: ICD-10-CM

## 2017-07-20 DIAGNOSIS — C34.32: Primary | ICD-10-CM

## 2017-07-20 DIAGNOSIS — M19.011: ICD-10-CM

## 2017-07-20 PROCEDURE — 73502 X-RAY EXAM HIP UNI 2-3 VIEWS: CPT

## 2017-07-20 PROCEDURE — 73030 X-RAY EXAM OF SHOULDER: CPT

## 2017-07-20 PROCEDURE — 71250 CT THORAX DX C-: CPT

## 2017-07-20 NOTE — RAD
EXAM: Right hip 2 views.



HISTORY: Right hip pain, lung cancer.



COMPARISON: 3/9/2017.



FINDINGS: There are no clearly suspicious lesions by radiographs. There is

moderately decreased femoral head/neck offset anteriorly. Lucencies underlying

the anterior femoral head/neck junction are benign and consistent with

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 The joint spaces of

the right hip are maintained. No fractures are identified.



IMPRESSION:

1. Findings consistent with femoroacetabular impingement. No clearly

suspicious lesions by radiographs.

## 2017-07-20 NOTE — RAD
EXAM: CT of the chest without intravenous contrast.



HISTORY: Lung cancer staging.



TECHNIQUE: Computed tomography of the chest was performed without intravenous

contrast.



COMPARISON: 2/21/2017.



FINDINGS: Images of the upper abdomen reveal fluid density lesions in the

liver measuring up to 3.6 x 2.5 cm in segment 2. These are stable and most

likely represent cysts. Another cyst in the left kidney measures 2.1 cm. Bone

windows reveal no suspicious lesions. A right-sided port catheter has its tip

in the superior cavoatrial junction.



There is mild diffuse enlargement of the thyroid gland on the right greater

than left. There are no pathologically enlarged mediastinal or axillary lymph

nodes. A small pericardial effusion has developed in the interval. There is no

pleural effusion. The heart is not enlarged. There are atherosclerotic

calcifications of the coronary arteries.



The previously noted left infrahilar mass has almost completely resolved. A

small region of scarring adjacent to the left inferior hilum now measures

approximately 1.5 x 0.9 cm on image 35, as compared with probably 5.5 x 3.5 cm

previously. No pulmonary metastases are seen.



Bronchial wall thickening is consistent with acute or chronic bronchitis.

Interstitial opacities dependently in the bases represent a combination of

atelectasis along with interstitial scarring.



IMPRESSION:

1. Excellent response to therapy. The left infrahilar mass has almost

completely resolved. No evidence of metastatic disease.

2. Interval development of a small pericardial effusion.

3. Bronchial wall thickening and interstitial opacities in the bases.

Correlate for chronic bronchitis with a component of interstitial lung

disease.

3. Diffuse thyroid enlargement. Correlate with thyroid function tests.



*One or more of the following individualized dose reduction techniques were

utilized for this examination:  

1. Automated exposure control.  

2. Adjustment of the mA and/or kV according to patient size.  

3. Use of iterative reconstruction technique.

## 2017-07-20 NOTE — RAD
EXAM: Right shoulder 3 views.



HISTORY: Right shoulder pain, lung cancer.



COMPARISON: 3/9/2017, today's CT.



FINDINGS: A right-sided port catheter has its tip in the superior cavoatrial

junction. Small osteophytes indicate mild acromioclavicular and glenohumeral

osteoarthritis. Mild superior subluxation of the humeral head suggests early

rotator cuff arthropathy. There are no clearly suspicious lesions by

radiographs. No fractures are identified.



IMPRESSION:

1. Correlate for correlate rotator cuff arthropathy.

2. Mild acromioclavicular and glenohumeral osteoarthritis.

## 2018-01-31 ENCOUNTER — HOSPITAL ENCOUNTER (OUTPATIENT)
Dept: HOSPITAL 61 - CT | Age: 83
Discharge: HOME | End: 2018-01-31
Attending: RADIOLOGY
Payer: COMMERCIAL

## 2018-01-31 DIAGNOSIS — Z92.3: ICD-10-CM

## 2018-01-31 DIAGNOSIS — I31.3: ICD-10-CM

## 2018-01-31 DIAGNOSIS — J90: Primary | ICD-10-CM

## 2018-01-31 DIAGNOSIS — Z92.21: ICD-10-CM

## 2018-01-31 PROCEDURE — 71250 CT THORAX DX C-: CPT

## 2018-02-27 ENCOUNTER — HOSPITAL ENCOUNTER (OUTPATIENT)
Dept: HOSPITAL 61 - RAD | Age: 83
Discharge: HOME | End: 2018-02-27
Attending: FAMILY MEDICINE
Payer: COMMERCIAL

## 2018-02-27 DIAGNOSIS — R91.8: ICD-10-CM

## 2018-02-27 DIAGNOSIS — J18.9: Primary | ICD-10-CM

## 2018-02-27 PROCEDURE — 71046 X-RAY EXAM CHEST 2 VIEWS: CPT

## 2018-05-10 ENCOUNTER — HOSPITAL ENCOUNTER (OUTPATIENT)
Dept: HOSPITAL 61 - CT | Age: 83
Discharge: HOME | End: 2018-05-10
Attending: RADIOLOGY
Payer: COMMERCIAL

## 2018-05-10 DIAGNOSIS — C34.32: Primary | ICD-10-CM

## 2018-05-10 DIAGNOSIS — K76.89: ICD-10-CM

## 2018-05-10 PROCEDURE — 71250 CT THORAX DX C-: CPT

## 2018-06-13 ENCOUNTER — HOSPITAL ENCOUNTER (OUTPATIENT)
Dept: HOSPITAL 61 - RAD | Age: 83
Discharge: HOME | End: 2018-06-13
Payer: COMMERCIAL

## 2018-06-13 DIAGNOSIS — C34.32: Primary | ICD-10-CM

## 2018-06-13 PROCEDURE — 71046 X-RAY EXAM CHEST 2 VIEWS: CPT
